# Patient Record
Sex: FEMALE | Race: WHITE | NOT HISPANIC OR LATINO | URBAN - METROPOLITAN AREA
[De-identification: names, ages, dates, MRNs, and addresses within clinical notes are randomized per-mention and may not be internally consistent; named-entity substitution may affect disease eponyms.]

---

## 2017-06-19 ENCOUNTER — EMERGENCY (EMERGENCY)
Facility: HOSPITAL | Age: 62
LOS: 1 days | Discharge: HOME | End: 2017-06-19
Attending: EMERGENCY MEDICINE | Admitting: EMERGENCY MEDICINE
Payer: COMMERCIAL

## 2017-06-19 VITALS
HEART RATE: 74 BPM | WEIGHT: 134.92 LBS | DIASTOLIC BLOOD PRESSURE: 70 MMHG | RESPIRATION RATE: 14 BRPM | SYSTOLIC BLOOD PRESSURE: 160 MMHG | TEMPERATURE: 97 F | HEIGHT: 66 IN | OXYGEN SATURATION: 100 %

## 2017-06-19 VITALS
TEMPERATURE: 98 F | DIASTOLIC BLOOD PRESSURE: 64 MMHG | RESPIRATION RATE: 14 BRPM | SYSTOLIC BLOOD PRESSURE: 121 MMHG | HEART RATE: 70 BPM | OXYGEN SATURATION: 100 %

## 2017-06-19 DIAGNOSIS — Z88.1 ALLERGY STATUS TO OTHER ANTIBIOTIC AGENTS STATUS: ICD-10-CM

## 2017-06-19 DIAGNOSIS — R11.2 NAUSEA WITH VOMITING, UNSPECIFIED: ICD-10-CM

## 2017-06-19 DIAGNOSIS — G43.909 MIGRAINE, UNSPECIFIED, NOT INTRACTABLE, WITHOUT STATUS MIGRAINOSUS: ICD-10-CM

## 2017-06-19 DIAGNOSIS — Z87.891 PERSONAL HISTORY OF NICOTINE DEPENDENCE: ICD-10-CM

## 2017-06-19 DIAGNOSIS — I10 ESSENTIAL (PRIMARY) HYPERTENSION: ICD-10-CM

## 2017-06-19 LAB
ALBUMIN SERPL ELPH-MCNC: 3.6 G/DL — SIGNIFICANT CHANGE UP (ref 3.3–5)
ALP SERPL-CCNC: 55 U/L — SIGNIFICANT CHANGE UP (ref 40–120)
ALT FLD-CCNC: 21 U/L — SIGNIFICANT CHANGE UP (ref 12–78)
AMYLASE P1 CFR SERPL: 86 U/L — SIGNIFICANT CHANGE UP (ref 25–115)
ANION GAP SERPL CALC-SCNC: 7 MMOL/L — SIGNIFICANT CHANGE UP (ref 5–17)
AST SERPL-CCNC: 20 U/L — SIGNIFICANT CHANGE UP (ref 15–37)
BASOPHILS # BLD AUTO: 0.1 K/UL — SIGNIFICANT CHANGE UP (ref 0–0.2)
BASOPHILS NFR BLD AUTO: 1.3 % — SIGNIFICANT CHANGE UP (ref 0–2)
BILIRUB SERPL-MCNC: 0.5 MG/DL — SIGNIFICANT CHANGE UP (ref 0.2–1.2)
BUN SERPL-MCNC: 8 MG/DL — SIGNIFICANT CHANGE UP (ref 7–23)
CALCIUM SERPL-MCNC: 8.6 MG/DL — SIGNIFICANT CHANGE UP (ref 8.5–10.1)
CHLORIDE SERPL-SCNC: 95 MMOL/L — LOW (ref 96–108)
CO2 SERPL-SCNC: 31 MMOL/L — SIGNIFICANT CHANGE UP (ref 22–31)
CREAT SERPL-MCNC: 0.56 MG/DL — SIGNIFICANT CHANGE UP (ref 0.5–1.3)
EOSINOPHIL # BLD AUTO: 0.1 K/UL — SIGNIFICANT CHANGE UP (ref 0–0.5)
EOSINOPHIL NFR BLD AUTO: 1.2 % — SIGNIFICANT CHANGE UP (ref 0–6)
GLUCOSE SERPL-MCNC: 79 MG/DL — SIGNIFICANT CHANGE UP (ref 70–99)
HCT VFR BLD CALC: 39.8 % — SIGNIFICANT CHANGE UP (ref 34.5–45)
HGB BLD-MCNC: 13.7 G/DL — SIGNIFICANT CHANGE UP (ref 11.5–15.5)
LIDOCAIN IGE QN: 127 U/L — SIGNIFICANT CHANGE UP (ref 73–393)
LYMPHOCYTES # BLD AUTO: 1.4 K/UL — SIGNIFICANT CHANGE UP (ref 1–3.3)
LYMPHOCYTES # BLD AUTO: 21 % — SIGNIFICANT CHANGE UP (ref 13–44)
MCHC RBC-ENTMCNC: 34.4 GM/DL — SIGNIFICANT CHANGE UP (ref 32–36)
MCHC RBC-ENTMCNC: 35.8 PG — HIGH (ref 27–34)
MCV RBC AUTO: 104 FL — HIGH (ref 80–100)
MONOCYTES # BLD AUTO: 0.6 K/UL — SIGNIFICANT CHANGE UP (ref 0–0.9)
MONOCYTES NFR BLD AUTO: 9.6 % — HIGH (ref 1–9)
NEUTROPHILS # BLD AUTO: 4.4 K/UL — SIGNIFICANT CHANGE UP (ref 1.8–7.4)
NEUTROPHILS NFR BLD AUTO: 66.8 % — SIGNIFICANT CHANGE UP (ref 43–77)
PLATELET # BLD AUTO: 220 K/UL — SIGNIFICANT CHANGE UP (ref 150–400)
POTASSIUM SERPL-MCNC: 3.2 MMOL/L — LOW (ref 3.5–5.3)
POTASSIUM SERPL-SCNC: 3.2 MMOL/L — LOW (ref 3.5–5.3)
PROT SERPL-MCNC: 6.6 G/DL — SIGNIFICANT CHANGE UP (ref 6–8.3)
RBC # BLD: 3.83 M/UL — SIGNIFICANT CHANGE UP (ref 3.8–5.2)
RBC # FLD: 11.9 % — SIGNIFICANT CHANGE UP (ref 10.3–14.5)
SODIUM SERPL-SCNC: 133 MMOL/L — LOW (ref 135–145)
WBC # BLD: 6.5 K/UL — SIGNIFICANT CHANGE UP (ref 3.8–10.5)
WBC # FLD AUTO: 6.5 K/UL — SIGNIFICANT CHANGE UP (ref 3.8–10.5)

## 2017-06-19 PROCEDURE — 80053 COMPREHEN METABOLIC PANEL: CPT

## 2017-06-19 PROCEDURE — 70551 MRI BRAIN STEM W/O DYE: CPT | Mod: 26

## 2017-06-19 PROCEDURE — 72149 MRI LUMBAR SPINE W/DYE: CPT | Mod: 26

## 2017-06-19 PROCEDURE — 70551 MRI BRAIN STEM W/O DYE: CPT

## 2017-06-19 PROCEDURE — 85027 COMPLETE CBC AUTOMATED: CPT

## 2017-06-19 PROCEDURE — 99284 EMERGENCY DEPT VISIT MOD MDM: CPT | Mod: 25

## 2017-06-19 PROCEDURE — 99285 EMERGENCY DEPT VISIT HI MDM: CPT

## 2017-06-19 PROCEDURE — 96374 THER/PROPH/DIAG INJ IV PUSH: CPT

## 2017-06-19 PROCEDURE — 96361 HYDRATE IV INFUSION ADD-ON: CPT

## 2017-06-19 PROCEDURE — 72142 MRI NECK SPINE W/DYE: CPT | Mod: 26

## 2017-06-19 PROCEDURE — 72149 MRI LUMBAR SPINE W/DYE: CPT

## 2017-06-19 PROCEDURE — 83690 ASSAY OF LIPASE: CPT

## 2017-06-19 PROCEDURE — 82150 ASSAY OF AMYLASE: CPT

## 2017-06-19 PROCEDURE — 72142 MRI NECK SPINE W/DYE: CPT

## 2017-06-19 PROCEDURE — 96375 TX/PRO/DX INJ NEW DRUG ADDON: CPT

## 2017-06-19 RX ORDER — SODIUM CHLORIDE 9 MG/ML
1000 INJECTION INTRAMUSCULAR; INTRAVENOUS; SUBCUTANEOUS ONCE
Qty: 0 | Refills: 0 | Status: COMPLETED | OUTPATIENT
Start: 2017-06-19 | End: 2017-06-19

## 2017-06-19 RX ORDER — SODIUM CHLORIDE 9 MG/ML
3 INJECTION INTRAMUSCULAR; INTRAVENOUS; SUBCUTANEOUS ONCE
Qty: 0 | Refills: 0 | Status: COMPLETED | OUTPATIENT
Start: 2017-06-19 | End: 2017-06-19

## 2017-06-19 RX ORDER — KETOROLAC TROMETHAMINE 30 MG/ML
30 SYRINGE (ML) INJECTION ONCE
Qty: 0 | Refills: 0 | Status: DISCONTINUED | OUTPATIENT
Start: 2017-06-19 | End: 2017-06-19

## 2017-06-19 RX ORDER — METOCLOPRAMIDE HCL 10 MG
10 TABLET ORAL ONCE
Qty: 0 | Refills: 0 | Status: COMPLETED | OUTPATIENT
Start: 2017-06-19 | End: 2017-06-19

## 2017-06-19 RX ORDER — MAGNESIUM SULFATE 500 MG/ML
2 VIAL (ML) INJECTION ONCE
Qty: 0 | Refills: 0 | Status: COMPLETED | OUTPATIENT
Start: 2017-06-19 | End: 2017-06-19

## 2017-06-19 RX ORDER — POTASSIUM CHLORIDE 20 MEQ
40 PACKET (EA) ORAL ONCE
Qty: 0 | Refills: 0 | Status: COMPLETED | OUTPATIENT
Start: 2017-06-19 | End: 2017-06-19

## 2017-06-19 RX ORDER — DIPHENHYDRAMINE HCL 50 MG
50 CAPSULE ORAL ONCE
Qty: 0 | Refills: 0 | Status: COMPLETED | OUTPATIENT
Start: 2017-06-19 | End: 2017-06-19

## 2017-06-19 RX ADMIN — SODIUM CHLORIDE 1000 MILLILITER(S): 9 INJECTION INTRAMUSCULAR; INTRAVENOUS; SUBCUTANEOUS at 19:14

## 2017-06-19 RX ADMIN — Medication 30 MILLIGRAM(S): at 22:32

## 2017-06-19 RX ADMIN — Medication 10 MILLIGRAM(S): at 16:11

## 2017-06-19 RX ADMIN — Medication 50 GRAM(S): at 21:29

## 2017-06-19 RX ADMIN — SODIUM CHLORIDE 3 MILLILITER(S): 9 INJECTION INTRAMUSCULAR; INTRAVENOUS; SUBCUTANEOUS at 16:12

## 2017-06-19 RX ADMIN — Medication 30 MILLIGRAM(S): at 22:00

## 2017-06-19 RX ADMIN — SODIUM CHLORIDE 1000 MILLILITER(S): 9 INJECTION INTRAMUSCULAR; INTRAVENOUS; SUBCUTANEOUS at 20:06

## 2017-06-19 RX ADMIN — SODIUM CHLORIDE 1000 MILLILITER(S): 9 INJECTION INTRAMUSCULAR; INTRAVENOUS; SUBCUTANEOUS at 16:12

## 2017-06-19 RX ADMIN — Medication 125 MILLIGRAM(S): at 20:03

## 2017-06-19 RX ADMIN — Medication 40 MILLIEQUIVALENT(S): at 21:14

## 2017-06-19 RX ADMIN — Medication 50 MILLIGRAM(S): at 16:11

## 2017-06-19 NOTE — ED PROVIDER NOTE - CHPI ED SYMPTOMS NEG
no confusion/no dizziness/no weakness/no fever/no loss of consciousness/no change in level of consciousness

## 2017-06-19 NOTE — ED ADULT NURSE NOTE - CHPI ED SYMPTOMS NEG
no loss of consciousness/no change in level of consciousness/no blurred vision/no fever/no nausea/no dizziness/no confusion

## 2017-06-19 NOTE — ED PROVIDER NOTE - OBJECTIVE STATEMENT
Pt is a 60 yo female who presents to the ED with a cc of post LP headache.  Pt reports that she had a lumbar epidural placed on June 9th.  Since then she has been experiencing a dull diffuse HA associated with photophobia, phonophobia, chills, N/V, and blurry vision.  Pt reports that in 2010 she had a similar headache after receiving a lumbar epidural and required a blood patch at that time.   She was seen at [facility] ED last Wednesday and received 1 liter of fluid, had a CT head which was negative for acute findings, had blood work preformed, and received narcotic medication.  Pt reported no improvement in symptoms.  She followed up with Dr. Winter from neurology today and was sent to the ED for possible blood path

## 2017-06-19 NOTE — ED PROVIDER NOTE - CARE PLAN
Principal Discharge DX:	Migraine  Instructions for follow-up, activity and diet:	Return to the ED for any new or worsening symptoms  Take your medication as prescribed previously  Stop the Percocet and continue the Fioricet as prescribed   Follow up with your neurologist in 2-3 days for a recheck   Advance activity as tolerated  Secondary Diagnosis:	Headache

## 2017-06-19 NOTE — PROGRESS NOTE ADULT - SUBJECTIVE AND OBJECTIVE BOX
Anesthesia Note    I was asked to see this pleasant 60 y/o who is 9 days out from a transforaminal lumbar epidural injection for back pain.  The patient came to the ER with a complaint of a frontal headache.  The patient has had previous epidurals before with post procedural headaches.  The patient reports the headache is constant, unrelieved by lying down.  She has no tinnitus but numbness and tingling of her hands and more so the feet.  Her labs are essentially normal and she has no documented focal neurologic findings.  There are several factors that make this unlikely to be a post dural puncture headache.  The report of the original procedure was reviewed and there was no evidence of extravasation of contrast out of the epidural space.  The patients age is not typically one that gets post dural puncture headaches.  The length of the symptoms and the fact that it does not get better when recumbent are also issues leading me away from that diagnosis.  The numbness and tingling of the feet, which is getting worse,  also leads me away from a PDPH   I have spoken to the patient, her  and the ER attending Dr Barth about my findings.  The ER will perform a head MRI to eliminate and significant pathology.

## 2017-06-19 NOTE — ED ADULT NURSE NOTE - OBJECTIVE STATEMENT
pt came in ED with c/o headache X 10 days. pt states she had LP epidural done 06/09/2017, since then she has been experiencing dull headache. pt added she was s/e at a diff hosp and was discharged with a  prescription of Percocet.

## 2017-06-19 NOTE — ED PROVIDER NOTE - PLAN OF CARE
Return to the ED for any new or worsening symptoms  Take your medication as prescribed previously  Stop the Percocet and continue the Fioricet as prescribed   Follow up with your neurologist in 2-3 days for a recheck   Advance activity as tolerated

## 2017-06-24 ENCOUNTER — EMERGENCY (EMERGENCY)
Facility: HOSPITAL | Age: 62
LOS: 1 days | Discharge: HOME | End: 2017-06-24
Attending: EMERGENCY MEDICINE | Admitting: EMERGENCY MEDICINE
Payer: COMMERCIAL

## 2017-06-24 VITALS
TEMPERATURE: 98 F | HEART RATE: 71 BPM | DIASTOLIC BLOOD PRESSURE: 72 MMHG | OXYGEN SATURATION: 100 % | SYSTOLIC BLOOD PRESSURE: 107 MMHG | RESPIRATION RATE: 15 BRPM

## 2017-06-24 VITALS
TEMPERATURE: 98 F | OXYGEN SATURATION: 100 % | WEIGHT: 134.92 LBS | SYSTOLIC BLOOD PRESSURE: 123 MMHG | RESPIRATION RATE: 16 BRPM | DIASTOLIC BLOOD PRESSURE: 66 MMHG | HEIGHT: 66 IN | HEART RATE: 79 BPM

## 2017-06-24 DIAGNOSIS — R51 HEADACHE: ICD-10-CM

## 2017-06-24 DIAGNOSIS — Z88.1 ALLERGY STATUS TO OTHER ANTIBIOTIC AGENTS STATUS: ICD-10-CM

## 2017-06-24 DIAGNOSIS — I10 ESSENTIAL (PRIMARY) HYPERTENSION: ICD-10-CM

## 2017-06-24 PROCEDURE — 99284 EMERGENCY DEPT VISIT MOD MDM: CPT | Mod: 25

## 2017-06-24 PROCEDURE — 96361 HYDRATE IV INFUSION ADD-ON: CPT

## 2017-06-24 PROCEDURE — 99284 EMERGENCY DEPT VISIT MOD MDM: CPT

## 2017-06-24 PROCEDURE — 96375 TX/PRO/DX INJ NEW DRUG ADDON: CPT

## 2017-06-24 PROCEDURE — 96374 THER/PROPH/DIAG INJ IV PUSH: CPT

## 2017-06-24 RX ORDER — SODIUM CHLORIDE 9 MG/ML
1000 INJECTION INTRAMUSCULAR; INTRAVENOUS; SUBCUTANEOUS ONCE
Qty: 0 | Refills: 0 | Status: COMPLETED | OUTPATIENT
Start: 2017-06-24 | End: 2017-06-24

## 2017-06-24 RX ORDER — KETOROLAC TROMETHAMINE 30 MG/ML
30 SYRINGE (ML) INJECTION ONCE
Qty: 0 | Refills: 0 | Status: DISCONTINUED | OUTPATIENT
Start: 2017-06-24 | End: 2017-06-24

## 2017-06-24 RX ORDER — METOCLOPRAMIDE HCL 10 MG
10 TABLET ORAL ONCE
Qty: 0 | Refills: 0 | Status: COMPLETED | OUTPATIENT
Start: 2017-06-24 | End: 2017-06-24

## 2017-06-24 RX ADMIN — Medication 30 MILLIGRAM(S): at 16:56

## 2017-06-24 RX ADMIN — Medication 30 MILLIGRAM(S): at 17:26

## 2017-06-24 RX ADMIN — SODIUM CHLORIDE 1000 MILLILITER(S): 9 INJECTION INTRAMUSCULAR; INTRAVENOUS; SUBCUTANEOUS at 16:56

## 2017-06-24 RX ADMIN — Medication 10 MILLIGRAM(S): at 16:57

## 2017-06-24 NOTE — ED PROVIDER NOTE - MUSCULOSKELETAL, MLM
Neck: supple, non tender. Spine appears normal, range of motion is not limited, no muscle or joint tenderness

## 2017-06-24 NOTE — ED PROVIDER NOTE - OBJECTIVE STATEMENT
60 y/o F with h/o migraine headaches c/o headache. Has had intermittent headaches over the past 3 weeks.  Began after a lumbar spine epidural.  Followed up  again with her spine dr.  Not felt to be a spinal fluid leak.  Seen here last week for same.  MRI brain and C/LS spine unremarkable.  Seen by anesthesia.  Again not felt to be a leak.   States she is still having headaches.   Denies fever, vomiting, visual changes, extremity weakness, or other complaints.

## 2017-06-24 NOTE — ED ADULT NURSE NOTE - CHPI ED SYMPTOMS NEG
no vomiting/no dizziness/no nausea/no fever/no numbness/no change in level of consciousness/no weakness/no confusion/no loss of consciousness/no blurred vision

## 2017-06-24 NOTE — ED ADULT NURSE NOTE - OBJECTIVE STATEMENT
62yo female presents to ED alert and oriented X4.  steady gait noted.  patient c/o headache for one month after epidural procedure for back pain.  patient denies nausea, blurred vision, dizziness, shortness of breath, chest pain, lightheadedness, neck pain, palpitations.  respirations even and unlabored.

## 2017-12-13 ENCOUNTER — EMERGENCY (EMERGENCY)
Facility: HOSPITAL | Age: 62
LOS: 1 days | Discharge: HOME | End: 2017-12-13
Attending: EMERGENCY MEDICINE | Admitting: EMERGENCY MEDICINE
Payer: COMMERCIAL

## 2017-12-13 VITALS
OXYGEN SATURATION: 100 % | RESPIRATION RATE: 14 BRPM | TEMPERATURE: 97 F | SYSTOLIC BLOOD PRESSURE: 123 MMHG | HEART RATE: 67 BPM | DIASTOLIC BLOOD PRESSURE: 68 MMHG

## 2017-12-13 VITALS
SYSTOLIC BLOOD PRESSURE: 147 MMHG | TEMPERATURE: 98 F | DIASTOLIC BLOOD PRESSURE: 85 MMHG | HEART RATE: 79 BPM | HEIGHT: 66 IN | RESPIRATION RATE: 16 BRPM | WEIGHT: 132.06 LBS | OXYGEN SATURATION: 100 %

## 2017-12-13 LAB
ALBUMIN SERPL ELPH-MCNC: 3.8 G/DL — SIGNIFICANT CHANGE UP (ref 3.3–5)
ALP SERPL-CCNC: 56 U/L — SIGNIFICANT CHANGE UP (ref 40–120)
ALT FLD-CCNC: 22 U/L — SIGNIFICANT CHANGE UP (ref 12–78)
ANION GAP SERPL CALC-SCNC: 6 MMOL/L — SIGNIFICANT CHANGE UP (ref 5–17)
AST SERPL-CCNC: 17 U/L — SIGNIFICANT CHANGE UP (ref 15–37)
BASOPHILS # BLD AUTO: 0.1 K/UL — SIGNIFICANT CHANGE UP (ref 0–0.2)
BASOPHILS NFR BLD AUTO: 1 % — SIGNIFICANT CHANGE UP (ref 0–2)
BILIRUB SERPL-MCNC: 0.4 MG/DL — SIGNIFICANT CHANGE UP (ref 0.2–1.2)
BUN SERPL-MCNC: 11 MG/DL — SIGNIFICANT CHANGE UP (ref 7–23)
CALCIUM SERPL-MCNC: 8.8 MG/DL — SIGNIFICANT CHANGE UP (ref 8.5–10.1)
CHLORIDE SERPL-SCNC: 95 MMOL/L — LOW (ref 96–108)
CK MB BLD-MCNC: 3 % — SIGNIFICANT CHANGE UP (ref 0–3.5)
CK MB BLD-MCNC: 3.8 % — HIGH (ref 0–3.5)
CK MB CFR SERPL CALC: 1.7 NG/ML — SIGNIFICANT CHANGE UP (ref 0–3.6)
CK MB CFR SERPL CALC: 1.9 NG/ML — SIGNIFICANT CHANGE UP (ref 0–3.6)
CK SERPL-CCNC: 45 U/L — SIGNIFICANT CHANGE UP (ref 26–192)
CK SERPL-CCNC: 63 U/L — SIGNIFICANT CHANGE UP (ref 26–192)
CO2 SERPL-SCNC: 33 MMOL/L — HIGH (ref 22–31)
CREAT SERPL-MCNC: 0.71 MG/DL — SIGNIFICANT CHANGE UP (ref 0.5–1.3)
D DIMER BLD IA.RAPID-MCNC: <150 NG/ML DDU — SIGNIFICANT CHANGE UP
EOSINOPHIL # BLD AUTO: 0.1 K/UL — SIGNIFICANT CHANGE UP (ref 0–0.5)
EOSINOPHIL NFR BLD AUTO: 1 % — SIGNIFICANT CHANGE UP (ref 0–6)
GLUCOSE SERPL-MCNC: 98 MG/DL — SIGNIFICANT CHANGE UP (ref 70–99)
HCT VFR BLD CALC: 38.8 % — SIGNIFICANT CHANGE UP (ref 34.5–45)
HGB BLD-MCNC: 13 G/DL — SIGNIFICANT CHANGE UP (ref 11.5–15.5)
LYMPHOCYTES # BLD AUTO: 1.9 K/UL — SIGNIFICANT CHANGE UP (ref 1–3.3)
LYMPHOCYTES # BLD AUTO: 27.7 % — SIGNIFICANT CHANGE UP (ref 13–44)
MCHC RBC-ENTMCNC: 33.4 GM/DL — SIGNIFICANT CHANGE UP (ref 32–36)
MCHC RBC-ENTMCNC: 35.3 PG — HIGH (ref 27–34)
MCV RBC AUTO: 105.7 FL — HIGH (ref 80–100)
MONOCYTES # BLD AUTO: 0.6 K/UL — SIGNIFICANT CHANGE UP (ref 0–0.9)
MONOCYTES NFR BLD AUTO: 9.1 % — HIGH (ref 1–9)
NEUTROPHILS # BLD AUTO: 4.1 K/UL — SIGNIFICANT CHANGE UP (ref 1.8–7.4)
NEUTROPHILS NFR BLD AUTO: 61.2 % — SIGNIFICANT CHANGE UP (ref 43–77)
PLAT MORPH BLD: NORMAL — SIGNIFICANT CHANGE UP
PLATELET # BLD AUTO: 254 K/UL — SIGNIFICANT CHANGE UP (ref 150–400)
POTASSIUM SERPL-MCNC: 3.4 MMOL/L — LOW (ref 3.5–5.3)
POTASSIUM SERPL-SCNC: 3.4 MMOL/L — LOW (ref 3.5–5.3)
PROT SERPL-MCNC: 6.8 G/DL — SIGNIFICANT CHANGE UP (ref 6–8.3)
RBC # BLD: 3.67 M/UL — LOW (ref 3.8–5.2)
RBC # FLD: 13 % — SIGNIFICANT CHANGE UP (ref 10.3–14.5)
RBC BLD AUTO: SIGNIFICANT CHANGE UP
SODIUM SERPL-SCNC: 134 MMOL/L — LOW (ref 135–145)
TROPONIN I SERPL-MCNC: <.015 NG/ML — SIGNIFICANT CHANGE UP (ref 0.01–0.04)
TROPONIN I SERPL-MCNC: <.015 NG/ML — SIGNIFICANT CHANGE UP (ref 0.01–0.04)
WBC # BLD: 6.7 K/UL — SIGNIFICANT CHANGE UP (ref 3.8–10.5)
WBC # FLD AUTO: 6.7 K/UL — SIGNIFICANT CHANGE UP (ref 3.8–10.5)

## 2017-12-13 PROCEDURE — 82550 ASSAY OF CK (CPK): CPT

## 2017-12-13 PROCEDURE — 84484 ASSAY OF TROPONIN QUANT: CPT

## 2017-12-13 PROCEDURE — 99285 EMERGENCY DEPT VISIT HI MDM: CPT

## 2017-12-13 PROCEDURE — 82553 CREATINE MB FRACTION: CPT

## 2017-12-13 PROCEDURE — 93010 ELECTROCARDIOGRAM REPORT: CPT

## 2017-12-13 PROCEDURE — 71046 X-RAY EXAM CHEST 2 VIEWS: CPT

## 2017-12-13 PROCEDURE — 80053 COMPREHEN METABOLIC PANEL: CPT

## 2017-12-13 PROCEDURE — 99284 EMERGENCY DEPT VISIT MOD MDM: CPT | Mod: 25

## 2017-12-13 PROCEDURE — 99284 EMERGENCY DEPT VISIT MOD MDM: CPT

## 2017-12-13 PROCEDURE — 71020: CPT | Mod: 26

## 2017-12-13 PROCEDURE — 93005 ELECTROCARDIOGRAM TRACING: CPT

## 2017-12-13 PROCEDURE — 85379 FIBRIN DEGRADATION QUANT: CPT

## 2017-12-13 PROCEDURE — 85027 COMPLETE CBC AUTOMATED: CPT

## 2017-12-13 RX ORDER — ALPRAZOLAM 0.25 MG
0 TABLET ORAL
Qty: 0 | Refills: 0 | COMMUNITY

## 2017-12-13 RX ORDER — ASPIRIN/CALCIUM CARB/MAGNESIUM 324 MG
325 TABLET ORAL ONCE
Qty: 0 | Refills: 0 | Status: COMPLETED | OUTPATIENT
Start: 2017-12-13 | End: 2017-12-13

## 2017-12-13 RX ORDER — SODIUM CHLORIDE 9 MG/ML
3 INJECTION INTRAMUSCULAR; INTRAVENOUS; SUBCUTANEOUS ONCE
Qty: 0 | Refills: 0 | Status: COMPLETED | OUTPATIENT
Start: 2017-12-13 | End: 2017-12-13

## 2017-12-13 RX ORDER — SODIUM CHLORIDE 9 MG/ML
1000 INJECTION INTRAMUSCULAR; INTRAVENOUS; SUBCUTANEOUS ONCE
Qty: 0 | Refills: 0 | Status: COMPLETED | OUTPATIENT
Start: 2017-12-13 | End: 2017-12-13

## 2017-12-13 RX ADMIN — SODIUM CHLORIDE 1000 MILLILITER(S): 9 INJECTION INTRAMUSCULAR; INTRAVENOUS; SUBCUTANEOUS at 16:59

## 2017-12-13 RX ADMIN — SODIUM CHLORIDE 3 MILLILITER(S): 9 INJECTION INTRAMUSCULAR; INTRAVENOUS; SUBCUTANEOUS at 16:54

## 2017-12-13 RX ADMIN — Medication 325 MILLIGRAM(S): at 16:59

## 2017-12-13 NOTE — ED PROVIDER NOTE - PROGRESS NOTE DETAILS
Pt seen by Dr Mott, check 4 hr CE, outpt fu. At length discussion with patient regarding nature of the chest pain. Discussed results of all diagnostic testing in ED. Discussed chest pain precautions and instructions. Patient demonstrates understanding that a cardiac cause of the chest pain has not been totally excluded, but at this time there is no evidence to warrant an inpatient workup.  Discussed the importance of continued follow-up with Cardiology as outpatient to complete cardiac workup.   All results were explained to patient and a copy of all available results given.

## 2017-12-13 NOTE — ED PROVIDER NOTE - OBJECTIVE STATEMENT
63 yo F pw chest heaviness since ~ 10 am today, rad to back. Mild assoc SOB. NO palp/dizzy. No numb/ting/focal weak. No recent trauma. No agg/allev factors. No recent travel / sick contacts. No cough /sputum. NO neck pain.  Min inc pain with deep insp. No agg/allev factors. No other inj or co.

## 2017-12-13 NOTE — ED ADULT NURSE NOTE - OBJECTIVE STATEMENT
pt states she was at gynecologists office and her blood pressure was elevated (160/94) and " I have a  banging headache" pt also with sternal chest pain and a pain in the middle of her back, that started this morning.  pt denies nausea or vomiting.

## 2017-12-13 NOTE — CONSULT NOTE ADULT - SUBJECTIVE AND OBJECTIVE BOX
[facility] - Cami Jenkins, Gaston, Juan, Coleen, Tiera Lopez  Office Number: [phone]    Initial Consult Note    CHIEF COMPLAINT: Patient is a 62y old  Female who presents with a chief complaint of chest pain    HPI:61 yo F pw chest heaviness since ~ 10 am today, rad to back. Mild assoc SOB. NO palp/dizzy. No numb/ting/focal weak. No recent trauma. No agg/allev factors. No recent travel / sick contacts. No cough /sputum. NO neck pain.  Min inc pain with deep insp. No agg/allev factors.    Mrs Gomez is a 62 year old, current smoker with COPD, GERD, Raynauds,  here with chest heaviness. She has not felt pain like this before recently.  She saw her cardiologist about 3 months ago, and has had normal stress test within the last few years. Of note, she has a history of MVP and HTN, and currently has been taking Inderal and Lasix for some edema.   She is chest pain free now. She has history of anxiety and chronic headaches and takes klonipin.   Today she went to her OB and was found to have BP >160/90, then spoke to her PMD who recommended that she come to the ER because of the pain  No associated difficulty breathing, palpitations, lower extremity edema      PAST MEDICAL & SURGICAL HISTORY:  Mitral valve prolapse  HTN (hypertension)  No significant past surgical history      SOCIAL HISTORY:  + current smoker, but decreased    FAMILY HISTORY:  + CAD and HTN in family    MEDICATIONS  (STANDING):    MEDICATIONS  (PRN):      Allergies    Keflex (Rash)  tetracycline (Rash)    Intolerances        REVIEW OF SYSTEMS:    CONSTITUTIONAL: + weakness, no fevers or chills  EYES/ENT: No visual changes;  No vertigo or throat pain   NECK: No pain or stiffness  RESPIRATORY: No cough, wheezing, hemoptysis; No shortness of breath  CARDIOVASCULAR: + chest pain, no palpitations  GASTROINTESTINAL: No abdominal pain. No nausea, vomiting, or hematemesis; No diarrhea or constipation. No melena or hematochezia.  GENITOURINARY: No dysuria, frequency or hematuria  NEUROLOGICAL: No numbness or weakness  SKIN: No itching or rash  All other review of systems is negative unless indicated above    VITAL SIGNS:   Vital Signs Last 24 Hrs  T(C): 36.4 (13 Dec 2017 15:57), Max: 36.4 (13 Dec 2017 15:57)  T(F): 97.6 (13 Dec 2017 15:57), Max: 97.6 (13 Dec 2017 15:57)  HR: 79 (13 Dec 2017 15:57) (79 - 79)  BP: 147/85 (13 Dec 2017 15:57) (147/85 - 147/85)  BP(mean): --  RR: 16 (13 Dec 2017 15:57) (16 - 16)  SpO2: 100% (13 Dec 2017 15:57) (100% - 100%)    I&O's Summary      On Exam:    Constitutional: NAD, alert and oriented x 3  Lungs:  Non-labored, breath sounds are clear bilaterally, No wheezing, rales or rhonchi  Cardiovascular: RRR.  S1 and S2 positive.  No murmurs, rubs, gallops or clicks  Gastrointestinal: Bowel Sounds present, soft, nontender.   Lymph: No peripheral edema. No cervical lymphadenopathy.  Neurological: Alert, no focal deficits  Skin: No rashes or ulcers; her hands are cyanotic  Psych:  Mood & affect appropriate.    LABS: All Labs Reviewed:                        13.0   6.7   )-----------( 254      ( 13 Dec 2017 16:37 )             38.8     13 Dec 2017 16:37    134    |  95     |  11     ----------------------------<  98     3.4     |  33     |  0.71     Ca    8.8        13 Dec 2017 16:37    TPro  6.8    /  Alb  3.8    /  TBili  0.4    /  DBili  x      /  AST  17     /  ALT  22     /  AlkPhos  56     13 Dec 2017 16:37      CARDIAC MARKERS ( 13 Dec 2017 16:37 )  <.015 ng/mL / x     / 63 U/L / x     / 1.9 ng/mL      Blood Culture:         RADIOLOGY:    EKG: SR, unremarkable

## 2017-12-13 NOTE — ED PROVIDER NOTE - ENMT, MLM
Airway patent, Nasal mucosa clear. Mouth with normal mucosa. Throat has no vesicles, no oropharyngeal exudates and uvula is midline. MM Moist. Non-toxic, well appearing. neck supple. no meningeal signs.

## 2018-02-13 PROBLEM — Z00.00 ENCOUNTER FOR PREVENTIVE HEALTH EXAMINATION: Status: ACTIVE | Noted: 2018-02-13

## 2018-03-15 ENCOUNTER — APPOINTMENT (OUTPATIENT)
Dept: NEUROLOGY | Facility: CLINIC | Age: 63
End: 2018-03-15
Payer: COMMERCIAL

## 2018-03-15 VITALS
WEIGHT: 121 LBS | HEART RATE: 69 BPM | HEIGHT: 66 IN | DIASTOLIC BLOOD PRESSURE: 78 MMHG | SYSTOLIC BLOOD PRESSURE: 136 MMHG | BODY MASS INDEX: 19.44 KG/M2

## 2018-03-15 DIAGNOSIS — F15.90 OTHER STIMULANT USE, UNSPECIFIED, UNCOMPLICATED: ICD-10-CM

## 2018-03-15 DIAGNOSIS — Z86.79 PERSONAL HISTORY OF OTHER DISEASES OF THE CIRCULATORY SYSTEM: ICD-10-CM

## 2018-03-15 DIAGNOSIS — Z87.891 PERSONAL HISTORY OF NICOTINE DEPENDENCE: ICD-10-CM

## 2018-03-15 DIAGNOSIS — G44.209 TENSION-TYPE HEADACHE, UNSPECIFIED, NOT INTRACTABLE: ICD-10-CM

## 2018-03-15 DIAGNOSIS — Z78.9 OTHER SPECIFIED HEALTH STATUS: ICD-10-CM

## 2018-03-15 DIAGNOSIS — J45.20 MILD INTERMITTENT ASTHMA, UNCOMPLICATED: ICD-10-CM

## 2018-03-15 DIAGNOSIS — Z82.0 FAMILY HISTORY OF EPILEPSY AND OTHER DISEASES OF THE NERVOUS SYSTEM: ICD-10-CM

## 2018-03-15 DIAGNOSIS — J44.9 CHRONIC OBSTRUCTIVE PULMONARY DISEASE, UNSPECIFIED: ICD-10-CM

## 2018-03-15 DIAGNOSIS — Z86.69 PERSONAL HISTORY OF OTHER DISEASES OF THE NERVOUS SYSTEM AND SENSE ORGANS: ICD-10-CM

## 2018-03-15 PROCEDURE — 99244 OFF/OP CNSLTJ NEW/EST MOD 40: CPT

## 2018-03-15 RX ORDER — AZITHROMYCIN 250 MG/1
250 TABLET, FILM COATED ORAL
Qty: 10 | Refills: 0 | Status: ACTIVE | COMMUNITY
Start: 2018-01-15

## 2018-03-15 RX ORDER — AMPICILLIN 250 MG/1
250 CAPSULE ORAL
Qty: 40 | Refills: 0 | Status: ACTIVE | COMMUNITY
Start: 2017-10-16

## 2018-03-15 RX ORDER — CLARITHROMYCIN 500 MG/1
500 TABLET, FILM COATED ORAL
Qty: 30 | Refills: 0 | Status: ACTIVE | COMMUNITY
Start: 2017-09-22

## 2018-03-15 RX ORDER — AMITRIPTYLINE HYDROCHLORIDE 25 MG/1
25 TABLET, FILM COATED ORAL
Qty: 60 | Refills: 0 | Status: ACTIVE | COMMUNITY
Start: 2017-10-26

## 2018-03-15 RX ORDER — ACETAMINOPHEN, CAFFEINE, DIHYDROCODEINE BITARTRATE 320.5; 30; 16 MG/1; MG/1; MG/1
320.5-30-16 CAPSULE ORAL
Qty: 60 | Refills: 0 | Status: ACTIVE | COMMUNITY
Start: 2018-02-16

## 2018-03-15 RX ORDER — FLUCONAZOLE 200 MG/1
200 TABLET ORAL
Qty: 10 | Refills: 0 | Status: ACTIVE | COMMUNITY
Start: 2017-10-18

## 2018-03-15 RX ORDER — CLOTRIMAZOLE AND BETAMETHASONE DIPROPIONATE 10; .5 MG/G; MG/G
1-0.05 CREAM TOPICAL
Qty: 45 | Refills: 0 | Status: ACTIVE | COMMUNITY
Start: 2017-09-25

## 2018-03-15 RX ORDER — FLUTICASONE PROPIONATE 50 UG/1
50 SPRAY, METERED NASAL
Qty: 16 | Refills: 0 | Status: ACTIVE | COMMUNITY
Start: 2018-01-15

## 2018-03-15 RX ORDER — AMOXICILLIN 875 MG/1
875 TABLET, FILM COATED ORAL
Qty: 20 | Refills: 0 | Status: ACTIVE | COMMUNITY
Start: 2018-01-23

## 2018-03-15 RX ORDER — ALBUTEROL SULFATE 2.5 MG/3ML
(2.5 MG/3ML) SOLUTION RESPIRATORY (INHALATION)
Qty: 225 | Refills: 0 | Status: ACTIVE | COMMUNITY
Start: 2017-10-18

## 2018-03-15 RX ORDER — CLINDAMYCIN PHOSPHATE 100 MG/5G
2 CREAM VAGINAL
Qty: 58 | Refills: 0 | Status: ACTIVE | COMMUNITY
Start: 2018-03-09

## 2018-03-15 RX ORDER — CYCLOBENZAPRINE HYDROCHLORIDE 5 MG/1
5 TABLET, FILM COATED ORAL
Qty: 14 | Refills: 0 | Status: ACTIVE | COMMUNITY
Start: 2017-10-20

## 2018-03-15 RX ORDER — ONABOTULINUMTOXINA 200 [USP'U]/1
200 INJECTION, POWDER, LYOPHILIZED, FOR SOLUTION INTRADERMAL; INTRAMUSCULAR
Qty: 1 | Refills: 0 | Status: ACTIVE | COMMUNITY
Start: 2018-02-27

## 2018-03-15 RX ORDER — BUTALBITAL, ACETAMINOPHEN AND CAFFEINE 300; 50; 40 MG/1; MG/1; MG/1
50-300-40 CAPSULE ORAL
Qty: 40 | Refills: 0 | Status: ACTIVE | COMMUNITY
Start: 2018-03-06

## 2018-05-01 ENCOUNTER — EMERGENCY (EMERGENCY)
Facility: HOSPITAL | Age: 63
LOS: 1 days | Discharge: HOME | End: 2018-05-01
Attending: EMERGENCY MEDICINE | Admitting: EMERGENCY MEDICINE
Payer: COMMERCIAL

## 2018-05-01 VITALS
HEIGHT: 66 IN | DIASTOLIC BLOOD PRESSURE: 79 MMHG | RESPIRATION RATE: 15 BRPM | SYSTOLIC BLOOD PRESSURE: 165 MMHG | OXYGEN SATURATION: 100 % | TEMPERATURE: 98 F | HEART RATE: 67 BPM | WEIGHT: 134.92 LBS

## 2018-05-01 VITALS
RESPIRATION RATE: 18 BRPM | OXYGEN SATURATION: 100 % | DIASTOLIC BLOOD PRESSURE: 71 MMHG | HEART RATE: 62 BPM | SYSTOLIC BLOOD PRESSURE: 118 MMHG | TEMPERATURE: 99 F

## 2018-05-01 LAB
ALBUMIN SERPL ELPH-MCNC: 3.6 G/DL — SIGNIFICANT CHANGE UP (ref 3.3–5)
ALP SERPL-CCNC: 62 U/L — SIGNIFICANT CHANGE UP (ref 40–120)
ALT FLD-CCNC: 19 U/L — SIGNIFICANT CHANGE UP (ref 12–78)
ANION GAP SERPL CALC-SCNC: 9 MMOL/L — SIGNIFICANT CHANGE UP (ref 5–17)
AST SERPL-CCNC: 18 U/L — SIGNIFICANT CHANGE UP (ref 15–37)
BASOPHILS # BLD AUTO: 0.1 K/UL — SIGNIFICANT CHANGE UP (ref 0–0.2)
BASOPHILS NFR BLD AUTO: 1.1 % — SIGNIFICANT CHANGE UP (ref 0–2)
BILIRUB SERPL-MCNC: 0.4 MG/DL — SIGNIFICANT CHANGE UP (ref 0.2–1.2)
BUN SERPL-MCNC: 12 MG/DL — SIGNIFICANT CHANGE UP (ref 7–23)
CALCIUM SERPL-MCNC: 8.5 MG/DL — SIGNIFICANT CHANGE UP (ref 8.5–10.1)
CHLORIDE SERPL-SCNC: 91 MMOL/L — LOW (ref 96–108)
CK MB BLD-MCNC: 4.6 % — HIGH (ref 0–3.5)
CK MB CFR SERPL CALC: 2.5 NG/ML — SIGNIFICANT CHANGE UP (ref 0–3.6)
CK SERPL-CCNC: 54 U/L — SIGNIFICANT CHANGE UP (ref 26–192)
CO2 SERPL-SCNC: 29 MMOL/L — SIGNIFICANT CHANGE UP (ref 22–31)
CREAT SERPL-MCNC: 0.63 MG/DL — SIGNIFICANT CHANGE UP (ref 0.5–1.3)
EOSINOPHIL # BLD AUTO: 0.1 K/UL — SIGNIFICANT CHANGE UP (ref 0–0.5)
EOSINOPHIL NFR BLD AUTO: 0.9 % — SIGNIFICANT CHANGE UP (ref 0–6)
GLUCOSE SERPL-MCNC: 91 MG/DL — SIGNIFICANT CHANGE UP (ref 70–99)
HCT VFR BLD CALC: 36.8 % — SIGNIFICANT CHANGE UP (ref 34.5–45)
HGB BLD-MCNC: 13.1 G/DL — SIGNIFICANT CHANGE UP (ref 11.5–15.5)
LYMPHOCYTES # BLD AUTO: 1.8 K/UL — SIGNIFICANT CHANGE UP (ref 1–3.3)
LYMPHOCYTES # BLD AUTO: 27 % — SIGNIFICANT CHANGE UP (ref 13–44)
MCHC RBC-ENTMCNC: 35.4 PG — HIGH (ref 27–34)
MCHC RBC-ENTMCNC: 35.5 GM/DL — SIGNIFICANT CHANGE UP (ref 32–36)
MCV RBC AUTO: 99.8 FL — SIGNIFICANT CHANGE UP (ref 80–100)
MONOCYTES # BLD AUTO: 0.6 K/UL — SIGNIFICANT CHANGE UP (ref 0–0.9)
MONOCYTES NFR BLD AUTO: 8.8 % — SIGNIFICANT CHANGE UP (ref 1–9)
NEUTROPHILS # BLD AUTO: 4.2 K/UL — SIGNIFICANT CHANGE UP (ref 1.8–7.4)
NEUTROPHILS NFR BLD AUTO: 62.2 % — SIGNIFICANT CHANGE UP (ref 43–77)
PLATELET # BLD AUTO: 196 K/UL — SIGNIFICANT CHANGE UP (ref 150–400)
POTASSIUM SERPL-MCNC: 3.7 MMOL/L — SIGNIFICANT CHANGE UP (ref 3.5–5.3)
POTASSIUM SERPL-SCNC: 3.7 MMOL/L — SIGNIFICANT CHANGE UP (ref 3.5–5.3)
PROT SERPL-MCNC: 6.7 G/DL — SIGNIFICANT CHANGE UP (ref 6–8.3)
RBC # BLD: 3.69 M/UL — LOW (ref 3.8–5.2)
RBC # FLD: 11.2 % — SIGNIFICANT CHANGE UP (ref 10.3–14.5)
SODIUM SERPL-SCNC: 129 MMOL/L — LOW (ref 135–145)
TROPONIN I SERPL-MCNC: <.015 NG/ML — SIGNIFICANT CHANGE UP (ref 0.01–0.04)
WBC # BLD: 6.8 K/UL — SIGNIFICANT CHANGE UP (ref 3.8–10.5)
WBC # FLD AUTO: 6.8 K/UL — SIGNIFICANT CHANGE UP (ref 3.8–10.5)

## 2018-05-01 PROCEDURE — 82553 CREATINE MB FRACTION: CPT

## 2018-05-01 PROCEDURE — 99285 EMERGENCY DEPT VISIT HI MDM: CPT

## 2018-05-01 PROCEDURE — 96375 TX/PRO/DX INJ NEW DRUG ADDON: CPT

## 2018-05-01 PROCEDURE — 70450 CT HEAD/BRAIN W/O DYE: CPT

## 2018-05-01 PROCEDURE — 71045 X-RAY EXAM CHEST 1 VIEW: CPT | Mod: 26

## 2018-05-01 PROCEDURE — 82550 ASSAY OF CK (CPK): CPT

## 2018-05-01 PROCEDURE — 93005 ELECTROCARDIOGRAM TRACING: CPT

## 2018-05-01 PROCEDURE — 36415 COLL VENOUS BLD VENIPUNCTURE: CPT

## 2018-05-01 PROCEDURE — 71045 X-RAY EXAM CHEST 1 VIEW: CPT

## 2018-05-01 PROCEDURE — 99284 EMERGENCY DEPT VISIT MOD MDM: CPT | Mod: 25

## 2018-05-01 PROCEDURE — 96365 THER/PROPH/DIAG IV INF INIT: CPT

## 2018-05-01 PROCEDURE — 70450 CT HEAD/BRAIN W/O DYE: CPT | Mod: 26

## 2018-05-01 PROCEDURE — 84484 ASSAY OF TROPONIN QUANT: CPT

## 2018-05-01 PROCEDURE — 85027 COMPLETE CBC AUTOMATED: CPT

## 2018-05-01 PROCEDURE — 80053 COMPREHEN METABOLIC PANEL: CPT

## 2018-05-01 RX ORDER — SODIUM CHLORIDE 9 MG/ML
1000 INJECTION INTRAMUSCULAR; INTRAVENOUS; SUBCUTANEOUS ONCE
Qty: 0 | Refills: 0 | Status: COMPLETED | OUTPATIENT
Start: 2018-05-01 | End: 2018-05-01

## 2018-05-01 RX ORDER — FAMOTIDINE 10 MG/ML
20 INJECTION INTRAVENOUS ONCE
Qty: 0 | Refills: 0 | Status: COMPLETED | OUTPATIENT
Start: 2018-05-01 | End: 2018-05-01

## 2018-05-01 RX ORDER — ONDANSETRON 8 MG/1
4 TABLET, FILM COATED ORAL ONCE
Qty: 0 | Refills: 0 | Status: COMPLETED | OUTPATIENT
Start: 2018-05-01 | End: 2018-05-01

## 2018-05-01 RX ORDER — FAMOTIDINE 10 MG/ML
1 INJECTION INTRAVENOUS
Qty: 10 | Refills: 0
Start: 2018-05-01 | End: 2018-05-05

## 2018-05-01 RX ORDER — FAMOTIDINE 10 MG/ML
20 INJECTION INTRAVENOUS ONCE
Qty: 0 | Refills: 0 | Status: DISCONTINUED | OUTPATIENT
Start: 2018-05-01 | End: 2018-05-01

## 2018-05-01 RX ORDER — DIPHENHYDRAMINE HCL 50 MG
1 CAPSULE ORAL
Qty: 6 | Refills: 0
Start: 2018-05-01 | End: 2018-05-02

## 2018-05-01 RX ADMIN — SODIUM CHLORIDE 1000 MILLILITER(S): 9 INJECTION INTRAMUSCULAR; INTRAVENOUS; SUBCUTANEOUS at 17:55

## 2018-05-01 RX ADMIN — FAMOTIDINE 100 MILLIGRAM(S): 10 INJECTION INTRAVENOUS at 17:56

## 2018-05-01 RX ADMIN — ONDANSETRON 4 MILLIGRAM(S): 8 TABLET, FILM COATED ORAL at 17:56

## 2018-05-01 NOTE — ED ADULT NURSE NOTE - ED STAT RN HANDOFF DETAILS
Pt stable and resting in bed. Pt denies any pain or discomfort as of this time. Pt handoff report given to RN nelda for continuum of care. No sign of distress noted.

## 2018-05-01 NOTE — ED ADULT NURSE NOTE - OBJECTIVE STATEMENT
Pt in bed alert and oriented and resting in bed with the c/o headache, sinus congestion and nausea. As per Md's orders IV duran placed blood psecimen obtained and sent to lab. Meds given and tolerated well. Nursing care ongoing and safety maintained.

## 2018-05-01 NOTE — ED PROVIDER NOTE - ATTENDING CONTRIBUTION TO CARE
63yo f p/w chest discomfort, rash itching after taking second dose of bactrim for presumed sinusitis  VSS, nontoxic   lungs cta, abd soft, ntnd,   no e/o angioedema or anaphylaxis   ekg labs cxr unremarkable, pt w/ likely allergic reaction to meds   pt to f/u w/ pcp for further eval and mgmt

## 2018-05-01 NOTE — ED PROVIDER NOTE - OBJECTIVE STATEMENT
62 y female presents with sinus congestion x 2 weeks, was on augmentin x 10 days, flonase and claritin,  rxd by her pmd Dr Jorge,  symptoms had not improved much, was seen yesterday, was started on bactrim,  had 1st dose yesterday,  last night she felt warm, and itchy,  did not take the second dose yesterday, this morning took a dose of bactrim,  went to work, began to feel warm, flushed, itchy,  called her pmd office, was told not to take any more bactrim , and to drinks plenty of water,  shortly after began to feel chest tightness, nausea, no sob, no cough, no vomiting.  called her pmd office, was told to come to ed.  pmh mvp, htn.   Cardiologist Dr Samayoa

## 2018-05-01 NOTE — ED PROVIDER NOTE - CHPI ED SYMPTOMS NEG
no chills/no shortness of breath/no back pain/no fever/no cough/no syncope/no dizziness/no vomiting/no diaphoresis

## 2018-05-01 NOTE — ED PROVIDER NOTE - PROGRESS NOTE DETAILS
patient resting comfortably, results discussed, states she has ENT, Dr Alvarez will follow up , advised no abx, copy of results given, rx for benadryl and pepcid sent to pharmacy,  advised follow up with pmd dr Jorge, advised if any concerns, or condition worsens return to ed

## 2019-03-10 ENCOUNTER — TRANSCRIPTION ENCOUNTER (OUTPATIENT)
Age: 64
End: 2019-03-10

## 2021-05-25 NOTE — ED PROVIDER NOTE - DURATION
today Medical Necessity Statement: Based on the clinical exam, the pathology, the patient's preference and my medical judgement, Mohs surgery is the most appropriate treatment for this cancer compared to other treatments.

## 2021-06-10 PROBLEM — I34.1 NONRHEUMATIC MITRAL (VALVE) PROLAPSE: Chronic | Status: ACTIVE | Noted: 2017-06-19

## 2021-06-10 PROBLEM — I10 ESSENTIAL (PRIMARY) HYPERTENSION: Chronic | Status: ACTIVE | Noted: 2017-06-19

## 2021-06-15 ENCOUNTER — OUTPATIENT (OUTPATIENT)
Dept: OUTPATIENT SERVICES | Facility: HOSPITAL | Age: 66
LOS: 1 days | End: 2021-06-15
Payer: COMMERCIAL

## 2021-06-15 VITALS
SYSTOLIC BLOOD PRESSURE: 140 MMHG | HEIGHT: 66 IN | OXYGEN SATURATION: 97 % | DIASTOLIC BLOOD PRESSURE: 72 MMHG | RESPIRATION RATE: 16 BRPM | TEMPERATURE: 97 F | WEIGHT: 136.91 LBS | HEART RATE: 64 BPM

## 2021-06-15 DIAGNOSIS — N94.89 OTHER SPECIFIED CONDITIONS ASSOCIATED WITH FEMALE GENITAL ORGANS AND MENSTRUAL CYCLE: ICD-10-CM

## 2021-06-15 DIAGNOSIS — Z98.890 OTHER SPECIFIED POSTPROCEDURAL STATES: Chronic | ICD-10-CM

## 2021-06-15 DIAGNOSIS — Z98.49 CATARACT EXTRACTION STATUS, UNSPECIFIED EYE: Chronic | ICD-10-CM

## 2021-06-15 DIAGNOSIS — Z01.818 ENCOUNTER FOR OTHER PREPROCEDURAL EXAMINATION: ICD-10-CM

## 2021-06-15 DIAGNOSIS — Z90.89 ACQUIRED ABSENCE OF OTHER ORGANS: Chronic | ICD-10-CM

## 2021-06-15 DIAGNOSIS — Z98.891 HISTORY OF UTERINE SCAR FROM PREVIOUS SURGERY: Chronic | ICD-10-CM

## 2021-06-15 LAB
ALBUMIN SERPL ELPH-MCNC: 3.7 G/DL — SIGNIFICANT CHANGE UP (ref 3.3–5)
ALP SERPL-CCNC: 76 U/L — SIGNIFICANT CHANGE UP (ref 40–120)
ALT FLD-CCNC: 16 U/L — SIGNIFICANT CHANGE UP (ref 12–78)
ANION GAP SERPL CALC-SCNC: 5 MMOL/L — SIGNIFICANT CHANGE UP (ref 5–17)
AST SERPL-CCNC: 11 U/L — LOW (ref 15–37)
BILIRUB SERPL-MCNC: 0.5 MG/DL — SIGNIFICANT CHANGE UP (ref 0.2–1.2)
BUN SERPL-MCNC: 11 MG/DL — SIGNIFICANT CHANGE UP (ref 7–23)
CALCIUM SERPL-MCNC: 8.9 MG/DL — SIGNIFICANT CHANGE UP (ref 8.5–10.1)
CHLORIDE SERPL-SCNC: 97 MMOL/L — SIGNIFICANT CHANGE UP (ref 96–108)
CO2 SERPL-SCNC: 35 MMOL/L — HIGH (ref 22–31)
CREAT SERPL-MCNC: 0.67 MG/DL — SIGNIFICANT CHANGE UP (ref 0.5–1.3)
GLUCOSE SERPL-MCNC: 101 MG/DL — HIGH (ref 70–99)
HCT VFR BLD CALC: 37.9 % — SIGNIFICANT CHANGE UP (ref 34.5–45)
HGB BLD-MCNC: 12.5 G/DL — SIGNIFICANT CHANGE UP (ref 11.5–15.5)
MCHC RBC-ENTMCNC: 33 GM/DL — SIGNIFICANT CHANGE UP (ref 32–36)
MCHC RBC-ENTMCNC: 33.4 PG — SIGNIFICANT CHANGE UP (ref 27–34)
MCV RBC AUTO: 101.3 FL — HIGH (ref 80–100)
NRBC # BLD: 0 /100 WBCS — SIGNIFICANT CHANGE UP (ref 0–0)
PLATELET # BLD AUTO: 269 K/UL — SIGNIFICANT CHANGE UP (ref 150–400)
POTASSIUM SERPL-MCNC: 3.8 MMOL/L — SIGNIFICANT CHANGE UP (ref 3.5–5.3)
POTASSIUM SERPL-SCNC: 3.8 MMOL/L — SIGNIFICANT CHANGE UP (ref 3.5–5.3)
PROT SERPL-MCNC: 7.1 G/DL — SIGNIFICANT CHANGE UP (ref 6–8.3)
RBC # BLD: 3.74 M/UL — LOW (ref 3.8–5.2)
RBC # FLD: 14.4 % — SIGNIFICANT CHANGE UP (ref 10.3–14.5)
SODIUM SERPL-SCNC: 137 MMOL/L — SIGNIFICANT CHANGE UP (ref 135–145)
WBC # BLD: 6.21 K/UL — SIGNIFICANT CHANGE UP (ref 3.8–10.5)
WBC # FLD AUTO: 6.21 K/UL — SIGNIFICANT CHANGE UP (ref 3.8–10.5)

## 2021-06-15 PROCEDURE — 80053 COMPREHEN METABOLIC PANEL: CPT

## 2021-06-15 PROCEDURE — 93010 ELECTROCARDIOGRAM REPORT: CPT

## 2021-06-15 PROCEDURE — 93005 ELECTROCARDIOGRAM TRACING: CPT

## 2021-06-15 PROCEDURE — 85027 COMPLETE CBC AUTOMATED: CPT

## 2021-06-15 PROCEDURE — 36415 COLL VENOUS BLD VENIPUNCTURE: CPT

## 2021-06-15 PROCEDURE — G0463: CPT

## 2021-06-15 RX ORDER — CLONAZEPAM 1 MG
0 TABLET ORAL
Qty: 0 | Refills: 0 | DISCHARGE

## 2021-06-15 RX ORDER — VALACYCLOVIR 500 MG/1
0 TABLET, FILM COATED ORAL
Qty: 0 | Refills: 0 | DISCHARGE

## 2021-06-15 NOTE — H&P PST ADULT - ATTENDING COMMENTS
Patient admitted to [facility] for resection of painful sebaceous cysts and vulva biopsies. No change in status since initial visit.

## 2021-06-15 NOTE — H&P PST ADULT - NSICDXFAMILYHX_GEN_ALL_CORE_FT
FAMILY HISTORY:  Father  Still living? No  Family history of CVA, Age at diagnosis: Age Unknown  FH: heart disease, Age at diagnosis: Age Unknown  FH: type 2 diabetes, Age at diagnosis: Age Unknown    Mother  Still living? No  FH: colon cancer, Age at diagnosis: Age Unknown    Sibling  Still living? No  FH: colon cancer, Age at diagnosis: Age Unknown

## 2021-06-15 NOTE — H&P PST ADULT - HISTORY OF PRESENT ILLNESS
66 yo female with HTN scheduled for        Patient states she had the cyst in her vaginal area for one year, pain is worse after sitting and intermittently 5/10 64 yo female with HTN COPD Barretts Esophagus Reflux  IBS Migraines scheduled for Excision of Vulva Lesions on 6/24/21 with Dr Carias.   Patient states she had the cyst in her vaginal area for one year, pain is worse after sitting and intermittently 5/10.

## 2021-06-15 NOTE — H&P PST ADULT - ASSESSMENT
66 yo female with HTN COPD Barretts Esophagus Reflux  IBS Migraines scheduled for Excision of Vulva Lesions on 6/24/21 with Dr Carias

## 2021-06-15 NOTE — H&P PST ADULT - NSICDXPASTSURGICALHX_GEN_ALL_CORE_FT
PAST SURGICAL HISTORY:  H/O  section x3    H/O excision of mass left shoulder    S/P cataract surgery 5 years ago    S/P rotator cuff repair left 2019    S/P tonsillectomy age 3

## 2021-06-15 NOTE — H&P PST ADULT - NSICDXPASTMEDICALHX_GEN_ALL_CORE_FT
PAST MEDICAL HISTORY:  Acid reflux     Anxiety     Barretts esophagus     Chronic obstructive pulmonary disease (COPD)     H/O emphysema     Hiatal hernia     HTN (hypertension)     Irritable bowel syndrome     Migraines once a week    Mitral valve prolapse     Neck pain treated with monthly trigger point injections

## 2021-06-15 NOTE — H&P PST ADULT - VENOUS THROMBOEMBOLISM CURRENT STATUS
(0) indicator not present (0) indicator not present/(1) history of inflammatory bowel disease (IBD)/(1) other risk factor (includes escalating BMI, pack-years of smoking, diabetes requiring insulin, chemotherapy, female gender and length of surgery)

## 2021-06-15 NOTE — H&P PST ADULT - NSANTHOSAYNRD_GEN_A_CORE
No. VONNIE screening performed.  STOP BANG Legend: 0-2 = LOW Risk; 3-4 = INTERMEDIATE Risk; 5-8 = HIGH Risk

## 2021-06-15 NOTE — H&P PST ADULT - NSICDXPROBLEM_GEN_ALL_CORE_FT
PROBLEM DIAGNOSES  Problem: Other specified conditions associated with female genital organs and menstrual cycle  Assessment and Plan: Check Labs cbc cmp  ekg  medical clearance  peop instructions were givne  6/17 stop multivitamin and elderberry  day  of surgery use Xiidra Opthalmic and inhalers as prescribed  also take Claritin inderal Norvasc hycosamine Protonix Zofran with a tiny sip of water   smoking cessation discussed with patient   written information for follow up given  patient has had 2 covid vaccines no need for testing  patient verbalized understanding of instructions

## 2021-06-23 ENCOUNTER — TRANSCRIPTION ENCOUNTER (OUTPATIENT)
Age: 66
End: 2021-06-23

## 2021-06-23 NOTE — [facility]SU PATIENT PROFILE, ADULT - PSH
H/O  section  x3  H/O excision of mass  left shoulder  S/P cataract surgery  5 years ago  S/P rotator cuff repair  left 2019  S/P tonsillectomy  age 3

## 2021-06-23 NOTE — [facility]SU PATIENT PROFILE, ADULT - PMH
Acid reflux    Anxiety    Barretts esophagus    Chronic obstructive pulmonary disease (COPD)    H/O emphysema    Hiatal hernia    HTN (hypertension)    Irritable bowel syndrome    Migraines  once a week  Mitral valve prolapse    Neck pain  treated with monthly trigger point injections  Other specified conditions associated with female genital organs and menstrual cycle

## 2021-06-24 ENCOUNTER — RESULT REVIEW (OUTPATIENT)
Age: 66
End: 2021-06-24

## 2021-06-24 ENCOUNTER — OUTPATIENT (OUTPATIENT)
Dept: OUTPATIENT SERVICES | Facility: HOSPITAL | Age: 66
LOS: 1 days | End: 2021-06-24
Payer: COMMERCIAL

## 2021-06-24 VITALS
SYSTOLIC BLOOD PRESSURE: 117 MMHG | OXYGEN SATURATION: 99 % | HEART RATE: 68 BPM | DIASTOLIC BLOOD PRESSURE: 46 MMHG | RESPIRATION RATE: 14 BRPM

## 2021-06-24 VITALS
SYSTOLIC BLOOD PRESSURE: 119 MMHG | DIASTOLIC BLOOD PRESSURE: 54 MMHG | TEMPERATURE: 98 F | RESPIRATION RATE: 16 BRPM | OXYGEN SATURATION: 97 % | HEART RATE: 71 BPM | WEIGHT: 136.91 LBS | HEIGHT: 66 IN

## 2021-06-24 DIAGNOSIS — Z98.890 OTHER SPECIFIED POSTPROCEDURAL STATES: Chronic | ICD-10-CM

## 2021-06-24 DIAGNOSIS — Z98.49 CATARACT EXTRACTION STATUS, UNSPECIFIED EYE: Chronic | ICD-10-CM

## 2021-06-24 DIAGNOSIS — Z98.891 HISTORY OF UTERINE SCAR FROM PREVIOUS SURGERY: Chronic | ICD-10-CM

## 2021-06-24 DIAGNOSIS — N94.89 OTHER SPECIFIED CONDITIONS ASSOCIATED WITH FEMALE GENITAL ORGANS AND MENSTRUAL CYCLE: ICD-10-CM

## 2021-06-24 DIAGNOSIS — Z90.89 ACQUIRED ABSENCE OF OTHER ORGANS: Chronic | ICD-10-CM

## 2021-06-24 PROCEDURE — 88304 TISSUE EXAM BY PATHOLOGIST: CPT | Mod: 26

## 2021-06-24 PROCEDURE — 11423 EXC H-F-NK-SP B9+MARG 2.1-3: CPT

## 2021-06-24 PROCEDURE — 88304 TISSUE EXAM BY PATHOLOGIST: CPT

## 2021-06-24 RX ORDER — ONDANSETRON 8 MG/1
4 TABLET, FILM COATED ORAL ONCE
Refills: 0 | Status: DISCONTINUED | OUTPATIENT
Start: 2021-06-24 | End: 2021-06-24

## 2021-06-24 RX ORDER — SODIUM CHLORIDE 9 MG/ML
1000 INJECTION, SOLUTION INTRAVENOUS
Refills: 0 | Status: DISCONTINUED | OUTPATIENT
Start: 2021-06-24 | End: 2021-06-24

## 2021-06-24 RX ORDER — OXYCODONE HYDROCHLORIDE 5 MG/1
5 TABLET ORAL ONCE
Refills: 0 | Status: DISCONTINUED | OUTPATIENT
Start: 2021-06-24 | End: 2021-06-24

## 2021-06-24 RX ORDER — HYDROMORPHONE HYDROCHLORIDE 2 MG/ML
0.5 INJECTION INTRAMUSCULAR; INTRAVENOUS; SUBCUTANEOUS
Refills: 0 | Status: DISCONTINUED | OUTPATIENT
Start: 2021-06-24 | End: 2021-06-24

## 2021-06-24 RX ADMIN — SODIUM CHLORIDE 60 MILLILITER(S): 9 INJECTION, SOLUTION INTRAVENOUS at 06:58

## 2021-06-24 RX ADMIN — SODIUM CHLORIDE 75 MILLILITER(S): 9 INJECTION, SOLUTION INTRAVENOUS at 09:45

## 2021-06-24 NOTE — [facility] DISCHARGE PLAN (ADULT/PEDIATRIC) - ACTIVITY LEVEL
No heavy lifting/No sports/gym/No weight bearing/Nothing per rectum/Nothing per vagina/No tub baths/No douching/No tampons/No intercourse

## 2021-06-24 NOTE — BRIEF OPERATIVE NOTE - LESION SIZE
1-left labium 1 cm excision square; 2-right introitus bx 5 mm; 3-left introitus bx 5 mm; 4-2.0 cm square of tissue as deborah shape from right labium. due to the laxity of the skin these lesions were much smaller following excision on telfa submitted to path.

## 2021-06-24 NOTE — ASU DISCHARGE PLAN (ADULT/PEDIATRIC) - CARE PROVIDER_API CALL
Jessica Carias)  Obstetrics and Gynecology  [address] [place] [address]  Phone: [phone]  Fax: [phone]  Follow Up Time:

## 2021-06-24 NOTE — ASU DISCHARGE PLAN (ADULT/PEDIATRIC) - ASU DC SPECIAL INSTRUCTIONSFT
These are specific for your needs. Please do them all. If you aren't sure what to do please call me immediately, wound care is very important here. My cell is .     1. You MUST use ice, several crushed cubes into a latex glove, every 20 min on and off while awake for 48 hours. This will greatly reduce pain and swelling.     2. You MUSt begin your 3 X daily sitz baths this afternoon. You MUST thickly cover the sutures and the inner vulva biopsy sites with the bacitracin.     3. When you use the bathroom for urine or stool you MUSt gently irrigate the biopsied and repaired sites with a sloppy tissue, pat them dry, and re apply bacitracin generously.     4. Do NOT wear a pad. loose fitting underwear or none is best, you can sagar "non stick" gauze to put over the area to protect your clothing.     5. See me thesday or wednesday for a post op check, pls call to make this appointment.     Obviously, call me right away if fever, chills, bleeding, or pain.

## 2021-06-24 NOTE — BRIEF OPERATIVE NOTE - OPERATION/FINDINGS
Eua was performed and showed no unusual pelvic masses. On right labia majora were 3 grouped large 1 cm each sebacdous cysts, which when removed shrunk to about half size, grouped within few mm of each other; another 6-7 mm pair lf same were located in the left labia majora. These were excised and repaired in multiple layers to close defect. small biopsies were taken from introitus at 3 and 9 ockock and also primarily repaired. No complications were noted, and all counts correct X 3. Marcaine 0.5%  used for case with sedation, good results noted.

## 2021-06-24 NOTE — ASU DISCHARGE PLAN (ADULT/PEDIATRIC) - PAIN MANAGEMENT
you have percocet at home as directed. If you only need tylenol 2 tabs every 4 hours that would be fine as well.

## 2021-06-24 NOTE — ASU DISCHARGE PLAN (ADULT/PEDIATRIC) - CALL YOUR DOCTOR IF YOU HAVE ANY OF THE FOLLOWING:
Bleeding that does not stop/Swelling that gets worse/Pain not relieved by Medications/Wound/Surgical Site with redness, or foul smelling discharge or pus/Nausea and vomiting that does not stop/Unable to urinate/Inability to tolerate liquids or foods/Increased irritability or sluggishness

## 2021-06-24 NOTE — ASU DISCHARGE PLAN (ADULT/PEDIATRIC) - SITZ BATH DURATION DAY(S)
please take sitz baths to cover vulva lesions as well in warm water 3 X daily until all sutures fall  out

## 2021-06-24 NOTE — ASU PREOP CHECKLIST - WEIGHT IN KG
CARDIOLOGY FOLLOW-UP     Patient:  Alice Witt  YOB: 1940  Date of Visit:  12/12/19    Referring Provider :   Sumit Sadler DO    Chief Complaint   Patient presents with   • Follow-up   • Atrial Fibrillation       HPI: Last cardiovascular follow-up visit was October 17, 2019.  Closely monitoring hypertension secondary to weight loss, and poor appetite after death of .  History of hypertension, hyperlipidemia, anticoagulation secondary to an episode of PAF with elevated chads 2 vascular score.  Also patient on antiarrhythmic therapy specifically sotalol.  Breast Ca - left mastectomy with chemotherapy and radiation.  This was approximately 5 years ago.  Cardiovascular risk factors:     hypertension , elevated cholesterol, obesity, age greater than 55 in a woman and atrial fibrillation.     No formal exercise regimen.  CATH/CABG:None  Vascular studies: No recent  Ischemic evaluation: She had an ischemic evaluation in 2017 which was unremarkable with normal LVEF.  TTE: No recent.     Past Medical History:   Diagnosis Date   • Chronic atrial fibrillation 4/10/2019   • Chronic atrial fibrillation 5/12/2017   • Dizziness    • Encounter for monitoring sotalol therapy    • Essential hypertension    • Mixed hyperlipidemia    • Paroxysmal atrial fibrillation (CMS/HCC)      Results for ALICE WITT (MRN [id number]) as of 12/12/2019 08:59   Ref. Range 11/5/2019 10:05   CHOLESTEROL Latest Ref Range: <200 mg/dL 252 (H)   CALCULATED LDL Latest Ref Range: <130 mg/dL 158 (H)   HDL Latest Ref Range: >49 mg/dL 64   TRIGLYCERIDE Latest Ref Range: <150 mg/dL 151 (H)   CALCULATED NON HDL Latest Units: mg/dL 188   CHOL/HDL Latest Ref Range: <4.5  3.9       Results for orders placed or performed in visit on 10/17/19   ELECTROCARDIOGRAM 12-LEAD    Impression    Sinus rhythm with normal ME interval.  Intraventricular conduction delay.  Nonspecific ST abnormalities.  Axis slightly leftward.          ALLERGIES:   Allergen Reactions   • Prochlorperazine HIVES       Current Outpatient Medications   Medication Sig   • venlafaxine (EFFEXOR) 75 MG tablet TAKE 1 TABLET DAILY WITH FOOD   • atorvastatin (LIPITOR) 10 MG tablet Take 1 tablet by mouth daily.   • glimepiride (AMARYL) 1 MG tablet TAKE 1 TABLET DAILY   • losartan (COZAAR) 100 MG tablet TAKE 1 TABLET DAILY (IT REPLACES EXFORGE)   • amLODIPine (NORVASC) 10 MG tablet TAKE 1 TABLET DAILY (IT REPLACES EXFORGE)   • ALPRAZolam (XANAX) 0.5 MG tablet TAKE 1 TABLET BY MOUTH THREE TIMES DAILY AS NEEDED FOR SLEEP   • Calcium Carbonate-Vitamin D (CALCIUM, OYSTER SHELL,-VITAMIN D) 500-200 MG-UNIT tablet Take 1 tablet by mouth 2 times daily.   • sotalol (BETAPACE) 80 MG tablet TAKE 1 TABLET TWICE A DAY   • blood glucose (ONE TOUCH ULTRA TEST) test strip Test blood sugar 1 time daily as directed. Diagnosis: E11.9   • ONETOUCH DELICA LANCETS 33G Misc USE EVERY DAY AS DIRECTED   • warfarin (COUMADIN) 5 MG tablet Take 1 tablet by mouth daily. Tuesday, Thursday, Saturday and sunday   • apixaBAN (ELIQUIS) 5 MG Tab Take 1 tablet by mouth every 12 hours.     No current facility-administered medications for this visit.         Review of Systems   Constitutional: Positive for unexpected weight change (Poor appetite and weight loss.). Negative for activity change, appetite change and fever.        Uses a cane   HENT: Negative for dental problem, ear pain, facial swelling, postnasal drip and sinus pain.    Eyes: Negative for pain and discharge.   Respiratory: Negative for apnea, chest tightness, shortness of breath and wheezing.    Cardiovascular: Negative for chest pain, palpitations and leg swelling.   Gastrointestinal: Negative for abdominal distention, abdominal pain, blood in stool, constipation, diarrhea and vomiting.   Endocrine: Negative for cold intolerance, heat intolerance and polyuria.   Genitourinary: Negative for decreased urine volume, difficulty urinating and  flank pain.   Musculoskeletal: Negative for arthralgias, back pain, joint swelling and neck pain.   Skin: Negative for color change.   Allergic/Immunologic: Negative for environmental allergies and immunocompromised state.   Neurological: Negative for syncope, facial asymmetry, speech difficulty, weakness, light-headedness, numbness and headaches.   Psychiatric/Behavioral: Negative for confusion, hallucinations and sleep disturbance.        Depression       Vitals:    12/12/19 0849 12/12/19 0852   BP: 120/72 110/70   Pulse: 62    SpO2: 94%    Weight: 83.9 kg (185 lb)    Height: 5' 3\" (1.6 m)         Physical Exam   Constitutional: She appears healthy.   Sad affect   HENT:   Mouth/Throat: Oropharynx is clear. Pharynx is normal.   Eyes: Pupils are equal, round, and reactive to light. Conjunctivae are normal.   Neck: No JVD present. No neck adenopathy. No thyromegaly present.   Cardiovascular: Normal rate, regular rhythm, S1 normal, S2 normal, normal heart sounds and intact distal pulses. PMI is not displaced.   No murmur (soft early EDISON) heard.  Pulses:       Dorsalis pedis pulses are 1+ on the right side and 1+ on the left side.        Posterior tibial pulses are 1+ on the right side and 1+ on the left side.   No bruits   Pulmonary/Chest: Effort normal and breath sounds normal. No stridor. She has no wheezes. She has no rales. She exhibits no tenderness.   Abdominal: Soft. Bowel sounds are normal. She exhibits no distension and no mass. There is no hepatomegaly. There is no tenderness. Musculoskeletal:         General: No edema (bilateral lymphedema).     Neurological: She is alert and oriented to person, place, and time.   Skin: Skin is warm.        Laboratory Results:  No components found for: 64W    Chest X-ray: None recently        Assessment/Plan:  Anticoagulation goal of INR 2 to 3  Continue with warfarin. Fluctuations secondary to diet.  Discussion, on using 1 of the newer anticoagulants.  At this point, I  sent a prescription to pharmacy for 5 mg of Eliquis twice daily.  She will evaluate the cost and call us.    Encounter for monitoring sotalol therapy  Continue on sinus rhythm, QTC is appropriate.  Continue on current dose.    Essential hypertension  Hypertension is controlled.  We will continue current dose of losartan a day.    Mixed hyperlipidemia  Lipid abnormalities are this coming year, will reevaluate lipid panel.  We can see, if we can decrease dose of Lipitor.    Paroxysmal atrial fibrillation (CMS/HCC)  Appears to be in sinus rhythm on examination but with elevated chads 2 vascular score of over 3.0, necessitating anticoagulation.      Summary: At this point, patient continues to be in the morning.  But much improved.  At least eating habits are more consistent.  Continue with anticoagulation.  She is in sinus rhythm.  Continue with BP medications.  As well as sotalol.  Her last QTC was adequate.  She will follow-up with me in the spring.  I have ordered for her to have a lipid cholesterol number so that we can see if we can decrease her atorvastatin.  I have also transmitted an order for Eliquis to the pharmacy, and if the cost is right, she will be able to switch.  I think this would be great, since she lives alone at this point.      Return in about 6 months (around 6/12/2020).        Ashwini Jensen MD      Greater than 50% of the visit was spent counseling regarding above issues; total time spent 40 minutes.        62.1

## 2021-06-25 LAB — SURGICAL PATHOLOGY STUDY: SIGNIFICANT CHANGE UP

## 2021-12-17 ENCOUNTER — EMERGENCY (EMERGENCY)
Facility: HOSPITAL | Age: 66
LOS: 1 days | Discharge: HOME | End: 2021-12-17
Attending: EMERGENCY MEDICINE | Admitting: EMERGENCY MEDICINE
Payer: MEDICARE

## 2021-12-17 VITALS
SYSTOLIC BLOOD PRESSURE: 140 MMHG | DIASTOLIC BLOOD PRESSURE: 74 MMHG | OXYGEN SATURATION: 98 % | RESPIRATION RATE: 16 BRPM | TEMPERATURE: 98 F | HEART RATE: 84 BPM

## 2021-12-17 VITALS
HEIGHT: 66 IN | TEMPERATURE: 97 F | SYSTOLIC BLOOD PRESSURE: 119 MMHG | RESPIRATION RATE: 16 BRPM | DIASTOLIC BLOOD PRESSURE: 72 MMHG | WEIGHT: 136.91 LBS | OXYGEN SATURATION: 99 % | HEART RATE: 81 BPM

## 2021-12-17 VITALS
OXYGEN SATURATION: 100 % | TEMPERATURE: 100 F | DIASTOLIC BLOOD PRESSURE: 79 MMHG | HEIGHT: 66 IN | HEART RATE: 79 BPM | SYSTOLIC BLOOD PRESSURE: 138 MMHG | WEIGHT: 139.99 LBS | RESPIRATION RATE: 18 BRPM

## 2021-12-17 DIAGNOSIS — Z98.49 CATARACT EXTRACTION STATUS, UNSPECIFIED EYE: Chronic | ICD-10-CM

## 2021-12-17 DIAGNOSIS — Z90.89 ACQUIRED ABSENCE OF OTHER ORGANS: Chronic | ICD-10-CM

## 2021-12-17 DIAGNOSIS — Z98.890 OTHER SPECIFIED POSTPROCEDURAL STATES: Chronic | ICD-10-CM

## 2021-12-17 DIAGNOSIS — Z98.891 HISTORY OF UTERINE SCAR FROM PREVIOUS SURGERY: Chronic | ICD-10-CM

## 2021-12-17 PROBLEM — K21.9 GASTRO-ESOPHAGEAL REFLUX DISEASE WITHOUT ESOPHAGITIS: Chronic | Status: ACTIVE | Noted: 2021-06-15

## 2021-12-17 PROBLEM — K58.9 IRRITABLE BOWEL SYNDROME WITHOUT DIARRHEA: Chronic | Status: ACTIVE | Noted: 2021-06-15

## 2021-12-17 PROBLEM — K22.70 BARRETT'S ESOPHAGUS WITHOUT DYSPLASIA: Chronic | Status: ACTIVE | Noted: 2021-06-15

## 2021-12-17 PROBLEM — F41.9 ANXIETY DISORDER, UNSPECIFIED: Chronic | Status: ACTIVE | Noted: 2021-06-15

## 2021-12-17 PROBLEM — J44.9 CHRONIC OBSTRUCTIVE PULMONARY DISEASE, UNSPECIFIED: Chronic | Status: ACTIVE | Noted: 2021-06-15

## 2021-12-17 PROBLEM — K44.9 DIAPHRAGMATIC HERNIA WITHOUT OBSTRUCTION OR GANGRENE: Chronic | Status: ACTIVE | Noted: 2021-06-15

## 2021-12-17 PROBLEM — N94.89 OTHER SPECIFIED CONDITIONS ASSOCIATED WITH FEMALE GENITAL ORGANS AND MENSTRUAL CYCLE: Chronic | Status: ACTIVE | Noted: 2021-06-15

## 2021-12-17 PROBLEM — G43.909 MIGRAINE, UNSPECIFIED, NOT INTRACTABLE, WITHOUT STATUS MIGRAINOSUS: Chronic | Status: ACTIVE | Noted: 2021-06-15

## 2021-12-17 PROBLEM — Z87.09 PERSONAL HISTORY OF OTHER DISEASES OF THE RESPIRATORY SYSTEM: Chronic | Status: ACTIVE | Noted: 2021-06-15

## 2021-12-17 PROBLEM — M54.2 CERVICALGIA: Chronic | Status: ACTIVE | Noted: 2021-06-15

## 2021-12-17 PROCEDURE — 71250 CT THORAX DX C-: CPT | Mod: MA

## 2021-12-17 PROCEDURE — 73000 X-RAY EXAM OF COLLAR BONE: CPT

## 2021-12-17 PROCEDURE — 70450 CT HEAD/BRAIN W/O DYE: CPT | Mod: MA

## 2021-12-17 PROCEDURE — 99284 EMERGENCY DEPT VISIT MOD MDM: CPT

## 2021-12-17 PROCEDURE — 99285 EMERGENCY DEPT VISIT HI MDM: CPT

## 2021-12-17 PROCEDURE — 99283 EMERGENCY DEPT VISIT LOW MDM: CPT | Mod: 25

## 2021-12-17 PROCEDURE — 73030 X-RAY EXAM OF SHOULDER: CPT | Mod: 26,RT

## 2021-12-17 PROCEDURE — 71250 CT THORAX DX C-: CPT | Mod: 26,MA

## 2021-12-17 PROCEDURE — 70450 CT HEAD/BRAIN W/O DYE: CPT | Mod: 26,MA

## 2021-12-17 PROCEDURE — 73030 X-RAY EXAM OF SHOULDER: CPT

## 2021-12-17 PROCEDURE — 73000 X-RAY EXAM OF COLLAR BONE: CPT | Mod: 26,RT

## 2021-12-17 PROCEDURE — 99284 EMERGENCY DEPT VISIT MOD MDM: CPT | Mod: 25

## 2021-12-17 RX ORDER — OXYCODONE AND ACETAMINOPHEN 5; 325 MG/1; MG/1
1 TABLET ORAL ONCE
Refills: 0 | Status: DISCONTINUED | OUTPATIENT
Start: 2021-12-17 | End: 2021-12-17

## 2021-12-17 RX ORDER — LIDOCAINE 4 G/100G
1 CREAM TOPICAL ONCE
Refills: 0 | Status: COMPLETED | OUTPATIENT
Start: 2021-12-17 | End: 2021-12-17

## 2021-12-17 RX ORDER — LIDOCAINE 4 G/100G
1 CREAM TOPICAL
Qty: 7 | Refills: 0
Start: 2021-12-17 | End: 2021-12-23

## 2021-12-17 RX ORDER — IBUPROFEN 200 MG
600 TABLET ORAL ONCE
Refills: 0 | Status: COMPLETED | OUTPATIENT
Start: 2021-12-17 | End: 2021-12-17

## 2021-12-17 RX ORDER — ACETAMINOPHEN 500 MG
650 TABLET ORAL ONCE
Refills: 0 | Status: DISCONTINUED | OUTPATIENT
Start: 2021-12-17 | End: 2021-12-17

## 2021-12-17 RX ORDER — IBUPROFEN 200 MG
1 TABLET ORAL
Qty: 21 | Refills: 0
Start: 2021-12-17 | End: 2021-12-23

## 2021-12-17 RX ADMIN — Medication 600 MILLIGRAM(S): at 14:00

## 2021-12-17 RX ADMIN — LIDOCAINE 1 PATCH: 4 CREAM TOPICAL at 13:33

## 2021-12-17 RX ADMIN — Medication 600 MILLIGRAM(S): at 13:33

## 2021-12-17 RX ADMIN — OXYCODONE AND ACETAMINOPHEN 1 TABLET(S): 5; 325 TABLET ORAL at 06:52

## 2021-12-17 RX ADMIN — OXYCODONE AND ACETAMINOPHEN 1 TABLET(S): 5; 325 TABLET ORAL at 14:00

## 2021-12-17 RX ADMIN — OXYCODONE AND ACETAMINOPHEN 1 TABLET(S): 5; 325 TABLET ORAL at 13:33

## 2021-12-17 NOTE — ED ADULT NURSE NOTE - NSICDXPASTMEDICALHX_GEN_ALL_CORE_FT
PAST MEDICAL HISTORY:  Acid reflux     Anxiety     Barretts esophagus     Chronic obstructive pulmonary disease (COPD)     H/O emphysema     Hiatal hernia     HTN (hypertension)     Irritable bowel syndrome     Migraines once a week    Mitral valve prolapse     Neck pain treated with monthly trigger point injections    Other specified conditions associated with female genital organs and menstrual cycle

## 2021-12-17 NOTE — ED PROVIDER NOTE - NSFOLLOWUPINSTRUCTIONS_ED_ALL_ED_FT
Clavicle Fracture    WHAT YOU NEED TO KNOW:    A clavicle fracture is a crack or break in your clavicle (collarbone).     Shoulder Anatomy         DISCHARGE INSTRUCTIONS:    Return to the emergency department if:   •Your shoulder, arm, hand, or fingers turn blue or pale, or feel cold or numb.      •Your pain gets worse, even after rest and medicine.      •Your splint feels tight, or you have increased swelling.      •You cannot move your fingers.      Call your doctor if:   •Your sling or wrap comes off or gets damaged.      •You have questions or concerns about your condition or care.      Medicines: You may need any of the following:   •Acetaminophen decreases pain and fever. It is available without a doctor's order. Ask how much to take and how often to take it. Follow directions. Read the labels of all other medicines you are using to see if they also contain acetaminophen, or ask your doctor or pharmacist. Acetaminophen can cause liver damage if not taken correctly. Do not use more than 4 grams (4,000 milligrams) total of acetaminophen in one day.       •NSAIDs, such as ibuprofen, help decrease swelling, pain, and fever. This medicine is available with or without a doctor's order. NSAIDs can cause stomach bleeding or kidney problems in certain people. If you take blood thinner medicine, always ask your healthcare provider if NSAIDs are safe for you. Always read the medicine label and follow directions.      •Take your medicine as directed. Contact your healthcare provider if you think your medicine is not helping or if you have side effects. Tell him or her if you are allergic to any medicine. Keep a list of the medicines, vitamins, and herbs you take. Include the amounts, and when and why you take them. Bring the list or the pill bottles to follow-up visits. Carry your medicine list with you in case of an emergency.      Sling or brace care: You will have a sling or a brace to keep your clavicle from moving while it heals. Ask your healthcare provider for more information on how to care for the sling or brace, including how to adjust it.    Shoulder Sling         Apply ice: Apply ice on your clavicle for 15 to 20 minutes every hour or as directed. Use an ice pack, or put crushed ice in a plastic bag. Cover the bag with a towel before you apply it to your clavicle. Ice decreases swelling and pain.    Activity: Limit activity as directed by your healthcare provider. Slowly start to do more each day as the pain decreases.    Physical therapy: Physical therapy may be recommended after your clavicle heals. A physical therapist teaches you exercises to help improve movement and strength, and to decrease pain.    Follow up with your doctor within 1 week or as directed: You may need to return for more x-rays to see how well your clavicle is healing. Write down your questions so you remember to ask them during your visits.

## 2021-12-17 NOTE — ED ADULT NURSE NOTE - NSIMPLEMENTINTERV_GEN_ALL_ED
Implemented All Fall Risk Interventions:  [place] to call system. Call bell, personal items and telephone within reach. Instruct patient to call for assistance. Room bathroom lighting operational. Non-slip footwear when patient is off stretcher. Physically safe environment: no spills, clutter or unnecessary equipment. Stretcher in lowest position, wheels locked, appropriate side rails in place. Provide visual cue, wrist band, yellow gown, etc. Monitor gait and stability. Monitor for mental status changes and reorient to person, place, and time. Review medications for side effects contributing to fall risk. Reinforce activity limits and safety measures with patient and family.

## 2021-12-17 NOTE — ED PROVIDER NOTE - CLINICAL SUMMARY MEDICAL DECISION MAKING FREE TEXT BOX
Pt is a 65 yo female s/p fall with back pain will get ct head chest pain control Sara: Pt is a 65 yo female s/p fall with back pain will get ct head chest pain control

## 2021-12-17 NOTE — ED PROVIDER NOTE - CARE PROVIDER_API CALL
Daljit Lamas)  Orthopaedic Surgery  [facility] [place] [address]  Phone: [phone]  Fax: [phone]  Follow Up Time:

## 2021-12-17 NOTE — ED PROVIDER NOTE - PATIENT PORTAL LINK FT
You can access the FollowMyHealth Patient Portal offered by [facility] by registering at the following website: [email or web address]. By joining [employer]’s FollowMyHealth portal, you will also be able to view your health information using other applications (apps) compatible with our system.

## 2021-12-17 NOTE — ED PROVIDER NOTE - MUSCULOSKELETAL, MLM
right clavicle ttp in sling TTP mid back no step off + bl parathorcic ttp nvi normal gait strength sensation

## 2021-12-17 NOTE — ED PROVIDER NOTE - NSFOLLOWUPINSTRUCTIONS_ED_ALL_ED_FT
follow up with orthopedics  return to er for any worsening symptoms    Back Pain    WHAT YOU NEED TO KNOW:    What do I need to know about back pain? Back pain is common. You may have back pain and muscle spasms. You may feel sore or stiff on one or both sides of your back. The pain may spread to your lower body. Conditions that affect the spine, joints, or muscles can cause back pain. These may include arthritis, spinal stenosis (narrowing of the spinal column), muscle tension, or breakdown of the spinal discs.    What increases my risk for back pain?   •Repeated bending, lifting, or twisting, or lifting heavy items      •Injury from a fall or accident      •Lack of regular physical activity       •Obesity or pregnancy       •Smoking      •Aging      •Driving, sitting, or standing for long periods      •Bad posture while sitting or standing      How is back pain diagnosed? Your healthcare provider will ask if you have any medical conditions. He or she may ask if you have a history of back pain and how it started. He or she may watch you stand and walk, and check your range of motion. Show him or her where you feel pain and what makes it better or worse. Describe the pain, how bad it is, and how long it lasts. Tell your provider if your pain worsens at night or when you lie on your back.    How is back pain treated?   •Medicines: ?NSAIDs, such as ibuprofen, help decrease swelling, pain, and fever. This medicine is available with or without a doctor's order. NSAIDs may be given as a pill or as a cream that is applied to your back. NSAIDs can cause stomach bleeding or kidney problems in certain people. If you take blood thinner medicine, always ask your healthcare provider if NSAIDs are safe for you. Always read the medicine label and follow directions.      ?Acetaminophen decreases pain and fever. It is available without a doctor's order. Ask how much to take and how often to take it. Follow directions. Read the labels of all other medicines you are using to see if they also contain acetaminophen, or ask your doctor or pharmacist. Acetaminophen can cause liver damage if not taken correctly. Do not use more than 4 grams (4,000 milligrams) total of acetaminophen in one day.       ?Muscle relaxers help decrease muscle spasms and back pain.      •Acupressure may be recommended to decrease pain and improve movement. Acupressure is pressure or localized massage to the area of your back pain.       •A transcutaneous electrical nerve stimulation (TENS) unit is a portable, pocket-sized, battery-powered device that attaches to your skin. It is usually placed over the area of pain. It uses mild, safe electrical signals to help control pain.      How do I manage my back pain?   •Apply ice on your back for 15 to 20 minutes every hour or as directed. Use an ice pack, or put crushed ice in a plastic bag. Cover it with a towel before you apply it to your skin. Ice helps prevent tissue damage and decreases pain.      •Apply heat on your back for 20 to 30 minutes every 2 hours for as many days as directed. Heat helps decrease pain and muscle spasms.      •Stay active as much as you can without causing more pain. Bed rest could make your back pain worse. Avoid heavy lifting until your pain is gone.      •Go to physical therapy as directed. A physical therapist can teach you exercises to help improve movement and strength, and to decrease pain.      Call your local emergency number (911 in the [place]) if:   •You have severe back pain with chest pain.      •You cannot control your urine or bowel movements.      •Your pain becomes so severe that you cannot walk.      When should I seek immediate care?   •You have pain, numbness, or weakness in one or both legs.      •You have severe back pain, nausea, and vomiting.      •You have severe back pain that spreads to your side or genital area.      When should I call my doctor?   •You have back pain that does not get better with rest and pain medicine.      •You have a fever.      •You have pain that worsens when you are on your back or when you rest.      •You have pain that worsens when you cough or sneeze.      •You lose weight without trying.      •You have questions or concerns about your condition or care.      CARE AGREEMENT:    You have the right to help plan your care. Learn about your health condition and how it may be treated. Discuss treatment options with your healthcare providers to decide what care you want to receive. You always have the right to refuse treatment.

## 2021-12-17 NOTE — ED PROVIDER NOTE - OBJECTIVE STATEMENT
67yo female who presents with right shoulder pain s/p fall. pt fell down 3 steps and hit her right side, shoulder and head, no LOC, pt c/o diffuse right shoulder pain, no dizziness, no neck or back pain no other complaints

## 2021-12-17 NOTE — ED PROVIDER NOTE - CONSTITUTIONAL, MLM
normal... Well appearing, awake, alert, oriented to person, place, time/situation and in no apparent distress. nc/at

## 2021-12-17 NOTE — ED ADULT NURSE NOTE - OBJECTIVE STATEMENT
Pt was seen here earlier for fall - dx with right clavicle fx- returned c/o of pain to back also states the pain med she was given at home (Percocet) did not help- pt with sling in place to right arm -

## 2021-12-17 NOTE — ED ADULT NURSE NOTE - IS THE PATIENT ABLE TO BE SCREENED?
No ctx, lof, bleeding, or discharge.  No HA or vision changes.  Patient is having some self-confidence issues regarding her weight gain this pregnancy.  As well as her stomach stretching.  She states she is used to being the same weight for a long time and it is bugging her that she is gaining so much weight.  She is also wondering if it is okay to exercise while pregnant. She would like to work out but her right knee has been hurting because of her weight gain.  She also has concerns about sore nipples.She is seeing [facility].   She has hx of depression but denies being on any medications for it in the past. She is not interested in taking medications at this time. She feels very supported by manny, the care guides and this clinic.     Good FM :yes    Plan today:   1 hr GTT, CBC and RPR  tdap  Hand outs given regarding parenting classes and food resources.   Discussed appropriate weight gain, healthy diet and exercise in pregnancy. Dicussed prenatal yoga and piliates. Encouraged her to discuss her concerns at [facility].   Discussed risk of PPD given hx of depression. She states understanding and we will plan for close f/u regarding this after delivery.     Alise Lang    
Please call patient and inform:  All labs within normal limits.  did not have any anemia or low blood count.  she also does not have any signs of gestational diabetes.
Yes

## 2021-12-17 NOTE — ED PROVIDER NOTE - OBJECTIVE STATEMENT
Pt is a 67 yo female with pmhx of HTN IBS anxiety GERD COPD barretts esophagus hiatal hernia migraines mvp here for mid back pain s/p fall earlier this morning. Pt was seen today for same complain had xray shoulder and clavicle fracture in sling and taking Percocet but still having back pain headache dizziness nausea denies any chest pain sob fever chills. pt is not on any blood thinners denies any numbness tingling weakness.

## 2021-12-17 NOTE — ED ADULT NURSE NOTE - NSIMPLEMENTINTERV_GEN_ALL_ED
Implemented All Fall Risk Interventions:  [place] to call system. Call bell, personal items and telephone within reach. Instruct patient to call for assistance. Room bathroom lighting operational. Non-slip footwear when patient is off stretcher. Physically safe environment: no spills, clutter or unnecessary equipment. Stretcher in lowest position, wheels locked, appropriate side rails in place. Provide visual cue, wrist band, yellow gown, etc. Monitor gait and stability. Monitor for mental status changes and reorient to person, place, and time. Review medications for side effects contributing to fall risk. Reinforce activity limits and safety measures with patient and family.

## 2021-12-17 NOTE — ED ADULT TRIAGE NOTE - CHIEF COMPLAINT QUOTE
Pt fell down 3 stairs into the door. Pt the side of her head, injured right shoulder, knee. denies taking blood thinners or LOC.

## 2021-12-17 NOTE — ED PROVIDER NOTE - PATIENT PORTAL LINK FT
You can access the FollowMyHealth Patient Portal offered by [facility] by registering at the following website: [email or web address]. By joining [employer]’s FollowMyHealth portal, you will also be able to view your health information using other applications (apps) compatible with our system.

## 2021-12-17 NOTE — ED ADULT NURSE NOTE - OBJECTIVE STATEMENT
Patient alert and oriented X 3. Complaining of right shoulder pain s/p fall. + radial pulses bilaterally. CAP refill less than 3 seconds.. Patient able to wiggle fingers. Patient hit head on door. No LOC.

## 2022-04-20 NOTE — ED PROVIDER NOTE - DIAGNOSIS COUNSELING, MDM
conducted a detailed discussion... I had a detailed discussion with the patient and/or guardian regarding the historical points, exam findings, and any diagnostic results supporting the discharge/admit diagnosis. Winlevi Pregnancy And Lactation Text: This medication is considered safe during pregnancy and breastfeeding.

## 2022-11-23 NOTE — ED PROVIDER NOTE - CROS ED ROS STATEMENT
History reviewed. No pertinent past medical history.    Past Surgical History:   Procedure Laterality Date    COLONOSCOPY N/A 11/1/2022    Procedure: COLONOSCOPY;  Surgeon: Michael Jarrett MD;  Location: [facility];  [id number]-[id number]-[id number]-st (GLUCOSAMINE CHONDROITIN PLUS) [phone] and Neer test bilaterally. - [place]'Epic messaging service.             all other ROS negative except as per HPI

## 2023-01-06 NOTE — H&P PST ADULT - ATTENDING PHYSICIAN: I HAVE REVIEWED THE CLINICAL DOCUMENTATION AND AGREE WITH THE ABOVE NOTE
The patient was prepped and draped in supine position on the procedure table. We began by infiltrating the midline scrotal raphe with 2% lidocaine and opened the midline scrotal raphae with a sharp hemostat. The right vas was grasped between thumb and forefinger and brought out to the opening, was then clamped with a vas clamp. The vas sheath was taken down with Bovie cautery and blunt dissection. The midline vas was clipped proximally and distally, a 1 cm section excised. The ends were then cauterized. Hemostasis was excellent.    We then performed a similar procedure on the left side and again hemostasis was excellent. Once finished I placed a sterile dressing. He was given postoperative instructions and an ice pack. He'll return in 8-10 weeks for post procedure semen analysis.   Statement Selected

## 2023-02-08 ENCOUNTER — APPOINTMENT (OUTPATIENT)
Dept: ORTHOPEDIC SURGERY | Facility: CLINIC | Age: 68
End: 2023-02-08
Payer: MEDICARE

## 2023-02-08 VITALS — HEIGHT: 66 IN | BODY MASS INDEX: 21.86 KG/M2 | WEIGHT: 136 LBS

## 2023-02-08 PROCEDURE — 99214 OFFICE O/P EST MOD 30 MIN: CPT | Mod: 25

## 2023-02-08 PROCEDURE — 29280 STRAPPING OF HAND OR FINGER: CPT

## 2023-02-08 RX ORDER — DICLOFENAC SODIUM 1 %
1 KIT TOPICAL DAILY
Qty: 1 | Refills: 2 | Status: ACTIVE | COMMUNITY
Start: 2023-02-08 | End: 1900-01-01

## 2023-02-08 NOTE — IMAGING
[de-identified] : Left index finger MCP joint swelling with ttp of rcl > lcl.\par There is no ligamentous laxity noted.\par  and intrinsic strength is 5/5 and all digit are nvi with FAROM.\par Left wrist with full and pain free ROM.

## 2023-02-08 NOTE — DATA REVIEWED
[Outside X-rays] : outside x-rays [Left] : left [Hand] : hand [I independently reviewed and interpreted images and report] : I independently reviewed and interpreted images and report [FreeTextEntry1] : Left 1st cmc joint OA with no acute bony pathology

## 2023-02-08 NOTE — ASSESSMENT
[FreeTextEntry1] : Pt provided keshawn loop x 6 weeks.\par Pt will rto in 6 weeks for f/u care.\par PRN otc nsaid for discomfort.

## 2023-02-08 NOTE — HISTORY OF PRESENT ILLNESS
[Dull/Aching] : dull/aching [Sharp] : sharp [Occasional] : occasional [de-identified] : 2/8/2023: rhd 68 YO female here with complaint of left index finger MCP pain/swelling x 2 weeks s/p lifting a plate of chicken.\par Pt was seen by her PCP and referred for xray at [facility] (which shows 1st cmc joint OA with no acute pathology).\par \par PMH: Mitral valve prolapse, ibs, htn, \par Allergies: Tetracycline, keflex and bactrim. \par  [] : no [FreeTextEntry1] : left hand  [FreeTextEntry3] : 2 weeks ago  [FreeTextEntry5] : patient was carrying a platter of chicken and tried to catch it when it almost fell. She has been feeling pain since.  [de-identified] : xrays

## 2023-03-22 ENCOUNTER — APPOINTMENT (OUTPATIENT)
Dept: ORTHOPEDIC SURGERY | Facility: CLINIC | Age: 68
End: 2023-03-22
Payer: MEDICARE

## 2023-03-22 VITALS — BODY MASS INDEX: 21.86 KG/M2 | WEIGHT: 136 LBS | HEIGHT: 66 IN

## 2023-03-22 PROCEDURE — 99214 OFFICE O/P EST MOD 30 MIN: CPT | Mod: 25

## 2023-03-22 PROCEDURE — 20600 DRAIN/INJ JOINT/BURSA W/O US: CPT | Mod: LT

## 2023-03-22 NOTE — ASSESSMENT
[FreeTextEntry1] : The patient was advised of the diagnosis. The natural history of the pathology was explained in full to the patient in layman's terms. All questions were answered. The risks and benefits of surgical and non-surgical treatment alternatives were explained in full to the patient.\par \par Small joint injection was performed of the right index finger MP. The indication for this procedure was pain and inflammation. The site was prepped with alcohol and ethyl chloride sprayed topically. An injection of Lidocaine 1cc of 1%  was used Betamethasone (Celestone) 1cc of 6mg.  Patient was advised to call if redness, pain or fever occur and apply ice for 15 minutes out of every hour for the next 12-24 hours as tolerated. The risks benefits, and alternatives have been discussed, and verbal consent was obtained\par \par pt was given CSI in right middle finger MP\par C/w keshawn johnson

## 2023-03-22 NOTE — HISTORY OF PRESENT ILLNESS
[de-identified] : 3/22/23:  Pt has been wearing keshawn loops and has minimal improvement.\par \par 2/8/2023: rhd 66 YO female here with complaint of left index finger MCP pain/swelling x 2 weeks s/p lifting a plate of chicken.\par Pt was seen by her PCP and referred for xray at [facility] (which shows 1st cmc joint OA with no acute pathology).\par \par PMH: Mitral valve prolapse, ibs, htn, \par Allergies: Tetracycline, keflex and bactrim. \par  [FreeTextEntry1] : left hand  [FreeTextEntry5] : patient feels that she is still having a lot of pain when holding anything

## 2023-03-22 NOTE — ED ADULT NURSE NOTE - NURSING NEURO ORIENTATION
TH with 5mm hyperdensity in anterior stella hemorrhage vs cavernona  Rpt CTH [id number] yo female PMHx of vertigo, parkinson's presents to ED bibems  from home s/p mechanical trip and fall from standing height onto face on concrete at 3:30pm today c/o face pain, L knee pain, b/l chest wall pain. She denies LOC.    Plan:  malignant W/O  MR Brain and A/P  Po Diet  LINA
per consult note A/P
oriented to person, place and time

## 2023-03-22 NOTE — IMAGING
[de-identified] : Left index finger MCP joint swelling with ttp of rcl > lcl.\par There is no ligamentous laxity noted.\par  and intrinsic strength is 5/5 and all digit are nvi with FAROM.\par Left wrist with full and pain free ROM.

## 2023-04-19 ENCOUNTER — APPOINTMENT (OUTPATIENT)
Dept: ORTHOPEDIC SURGERY | Facility: CLINIC | Age: 68
End: 2023-04-19

## 2023-04-21 ENCOUNTER — APPOINTMENT (OUTPATIENT)
Dept: ORTHOPEDIC SURGERY | Facility: CLINIC | Age: 68
End: 2023-04-21
Payer: MEDICARE

## 2023-04-21 VITALS — WEIGHT: 136 LBS | BODY MASS INDEX: 21.86 KG/M2 | HEIGHT: 66 IN

## 2023-04-21 PROCEDURE — 99213 OFFICE O/P EST LOW 20 MIN: CPT

## 2023-04-21 NOTE — PHYSICAL EXAM
[de-identified] : L hand \par 2nd MCP swelling\par Tender\par Stiffness\par +instability\par \par Xrays neg

## 2023-04-21 NOTE — HISTORY OF PRESENT ILLNESS
[Sudden] : sudden [8] : 8 [6] : 6 [Dull/Aching] : dull/aching [Radiating] : radiating [Shooting] : shooting [Throbbing] : throbbing [Nothing helps with pain getting better] : Nothing helps with pain getting better [de-identified] : L hand pain since Feb holding a tray of food\par \par IF MCP painand swelling \par \par Had injection by Marcus\par No relief  [] : no [FreeTextEntry1] : L hand  [FreeTextEntry3] : 02/2023 [FreeTextEntry5] : patient states she was holding a platter of food fell off her hand, since she is having pain and swelling. she is today for a second opinion  [FreeTextEntry7] : fingers and wrist  [de-identified] : activity  [de-identified] : 03/22/2023 [de-identified] : XR [de-identified] : Dr Velazquez

## 2023-04-23 ENCOUNTER — FORM ENCOUNTER (OUTPATIENT)
Age: 68
End: 2023-04-23

## 2023-04-24 ENCOUNTER — APPOINTMENT (OUTPATIENT)
Dept: MRI IMAGING | Facility: CLINIC | Age: 68
End: 2023-04-24
Payer: MEDICARE

## 2023-04-24 PROCEDURE — 73218 MRI UPPER EXTREMITY W/O DYE: CPT | Mod: LT,MH

## 2023-05-05 ENCOUNTER — APPOINTMENT (OUTPATIENT)
Dept: ORTHOPEDIC SURGERY | Facility: CLINIC | Age: 68
End: 2023-05-05
Payer: MEDICARE

## 2023-05-05 VITALS — WEIGHT: 136 LBS | HEIGHT: 66 IN | BODY MASS INDEX: 21.86 KG/M2

## 2023-05-05 DIAGNOSIS — S63.650A SPRAIN OF METACARPOPHALANGEAL JOINT OF RIGHT INDEX FINGER, INITIAL ENCOUNTER: ICD-10-CM

## 2023-05-05 PROCEDURE — J3490M: CUSTOM | Mod: NC

## 2023-05-05 PROCEDURE — 20600 DRAIN/INJ JOINT/BURSA W/O US: CPT | Mod: LT

## 2023-05-05 PROCEDURE — 99213 OFFICE O/P EST LOW 20 MIN: CPT | Mod: 25

## 2023-05-05 NOTE — HISTORY OF PRESENT ILLNESS
[8] : 8 [6] : 6 [Dull/Aching] : dull/aching [Radiating] : radiating [Shooting] : shooting [Throbbing] : throbbing [] : yes [de-identified] : L hand MRI shows MCP RCL partial tear, MCP OA\par \par She still has pain  [FreeTextEntry1] : L hand  [FreeTextEntry5] : pain is the same\par  [FreeTextEntry7] : fingers and wrist  [de-identified] : no

## 2023-05-05 NOTE — PHYSICAL EXAM
[de-identified] : L hand \par 2nd MCP swelling\par Tender\par Stiffness\par +instability\par \par

## 2023-05-05 NOTE — ASSESSMENT
[FreeTextEntry1] : L IF MCP Small joint injection was performed because of pain inflammation and stiffness\par Anesthesia: ethyl chloride sprayed topically\par Celestone: An injection of Celestone 1cc\par Marcaine: An injection of Marcaine 0.5% 1cc\par \par Patient has tried OTC's including aspirin, Ibuprofen, Aleve etc or prescription NSAIDS, and/or exercises at home and/ or\par physical therapy without satisfactory response.\par After verbal consent using sterile preparation and technique. The risks, benefits, and alternatives to cortisone injection\par were explained in full to the patient. Risks outlined include but are not limited to infection, sepsis, bleeding, scarring, skin\par discoloration, temporary increase in pain, syncopal episode, failure to resolve symptoms, allergic reaction, symptom\par recurrence, and elevation of blood sugar in diabetics. Patient understood the risks. All questions were answered. After\par discussion of options, patient requested an injection. Oral informed consent was obtained and sterile prep was done of the\par injection site. Sterile technique was utilized for the procedure including the preparation of the solutions used for the\par injection. Patient tolerated the procedure well. Advised to ice the injection site this evening.\par Prep with betadine locally to site. Sterile technique used

## 2023-05-18 ENCOUNTER — APPOINTMENT (OUTPATIENT)
Dept: SURGERY | Facility: CLINIC | Age: 68
End: 2023-05-18
Payer: MEDICARE

## 2023-05-18 VITALS
OXYGEN SATURATION: 99 % | HEIGHT: 66 IN | DIASTOLIC BLOOD PRESSURE: 81 MMHG | BODY MASS INDEX: 21.69 KG/M2 | HEART RATE: 76 BPM | SYSTOLIC BLOOD PRESSURE: 133 MMHG | TEMPERATURE: 98.9 F | WEIGHT: 135 LBS

## 2023-05-18 DIAGNOSIS — R10.12 LEFT UPPER QUADRANT PAIN: ICD-10-CM

## 2023-05-18 DIAGNOSIS — R10.32 LEFT LOWER QUADRANT PAIN: ICD-10-CM

## 2023-05-18 PROCEDURE — 99203 OFFICE O/P NEW LOW 30 MIN: CPT

## 2023-05-19 NOTE — ED PROVIDER NOTE - NSTOBACCO TYPE_GEN_A_CORE_RD
Called patient and left a message for patient to call back when they can. Reviewed patient chart.  Left contact my contact number [phone]        Time Spent This Encounter Total: 5  min medical record review  Monthly Minute Total including today: 5 minutes Cigarettes

## 2023-05-19 NOTE — CONSULT LETTER
[Dear  ___] : Dear  [unfilled], [Sincerely,] : Sincerely, [FreeTextEntry1] : I saw Natty Gomez in the office today in second surgical opinion.  She is a 67-year-old white female who been complaining of intermittent left upper quadrant left lower quadrant abdominal pain.  She also complains of abdominal bloating after eating and chronic constipation..  She has been evaluated by Dr. Mitchell from gastroenterology.  She had a CT scan of her abdomen pelvis performed on February 7, 2023 which was compared to a study done on December 14, 2022 which showed a stable elongated 2.4 x 0.6 x 1.0 cm hypodense structure along the course of the umbilicus.  No associated inflammatory changes or adjacent fat.\par \par She denies any bloody drainage from her umbilicus or any purulent drainage from her umbilicus.  She denies any umbilical discomfort.  Her past medical history is significant for cluster migraines, hypercholesterolemia, hypertension, COPD, GERD, asthma, chronic constipation, and irritable bowel syndrome.  Her past surgical history is significant for tonsillectomy, benign colonoscopy in 2021 as well as a benign upper endoscopy, shoulder surgery, repair of a fractured collarbone, and 3 C-sections.  She smoked 3 cigarettes/day for period of 25 years and quit smoking 5 years ago she occasionally drinks wine.  Her present medications include propranolol, Lasix, Stiolto Respimat, ferritin, fluticasone, dicyclomine, hyoscyamine, pantoprazole, Linzess, Zofran, and Emgality.  She is allergic to tetracycline, Keflex and Bactrim all 3 of which cause a rash.  Her father succumbed to heart attack at age 62 her mother succumbed to colon cancer at age 66.  A review of systems was performed and documented.\par \par On Physical Exam:  General: No acute distress, conversant, well-nourished.  Eyes: PERRLA, EOMI, anicteric.  Conjunctiva are noninjected and moist.  Vision is grossly intact.  ENT: Hearing is grossly intact.  There is no nasal discharge.  Ears and nose are symmetrical and atraumatic.  Nasal, oral, and oral pharyngeal mucosa are pink and moist with no evidence of ulceration.  Head/neck: Head is normocephalic.  Neck is supple.  Carotids have good upstroke.  Trachea is in the midline.  No thyromegaly.  Respiratory: Lungs are clear to auscultation with good inspiratory effort.  No rales, rhonchi or wheezing.  Cardiovascular: Heart is regular in rate and rhythm with no murmurs.  Abdomen: Soft and nontender.  Good bowel sounds present in all 4 quadrants.  No guarding, no rebound, and no peritoneal signs.  No evidence of hepatosplenomegaly.  No evidence of abdominal wall hernias.  Inguinal regions are unremarkable with no evidence of hernias.  Lymphatics: There is no evidence of masses, or lymphadenopathy in the head, neck, trunk, axillary, supraclavicular, or inguinal regions.  Extremities: Extremities reveal no gross deformities, good range of motion, no evidence of edema, no varicosities, no lymphadenopathy and peripheral pulses are intact.  Skin: Good color, turgor, texture with no gross lesions, no eruptions or rashes, no subcutaneous nodules and normal temperature.  Psychiatric: Awake, alert, and oriented x3 with an appropriate affect.\par \par \par \par \par \par \par \par \par \par \par \par \par \par \par Impression: Left upper quadrant and left lower quadrant abdominal pain most likely secondary to chronic constipation.  Hypodense structure along the course of the umbilicus elicited on CT scan.  No signs of urachal cyst or mass.\par \par Plan: No surgical intervention needed at this time. [FreeTextEntry3] : Hawk Talbot D.O., ANKUR, FACS\par , Surgery [facility]\par  Surgery [facility]

## 2023-06-02 ENCOUNTER — APPOINTMENT (OUTPATIENT)
Dept: ORTHOPEDIC SURGERY | Facility: CLINIC | Age: 68
End: 2023-06-02
Payer: MEDICARE

## 2023-06-02 VITALS — BODY MASS INDEX: 21.69 KG/M2 | HEIGHT: 66 IN | WEIGHT: 135 LBS

## 2023-06-02 DIAGNOSIS — S63.651A SPRAIN OF METACARPOPHALANGEAL JOINT OF LEFT INDEX FINGER, INITIAL ENCOUNTER: ICD-10-CM

## 2023-06-02 PROCEDURE — 99213 OFFICE O/P EST LOW 20 MIN: CPT

## 2023-06-02 NOTE — ASSESSMENT
[FreeTextEntry1] : I discussed RCL repair at the 2nd MCP\par She will think about it\par Consider another injection\par Return in 4 weeks

## 2023-06-02 NOTE — PHYSICAL EXAM
[de-identified] : L hand \par 2nd MCP swelling\par Tender\par Stiffness\par +instability\par \par

## 2023-06-02 NOTE — HISTORY OF PRESENT ILLNESS
[4] : 4 [Dull/Aching] : dull/aching [8] : 8 [de-identified] : L 2nd MCP injection last visit helped for a few days\par Has pain  [FreeTextEntry1] : L hand [de-identified] : inj,glove

## 2023-06-23 ENCOUNTER — APPOINTMENT (OUTPATIENT)
Dept: ORTHOPEDIC SURGERY | Facility: CLINIC | Age: 68
End: 2023-06-23

## 2023-07-10 ENCOUNTER — OUTPATIENT (OUTPATIENT)
Dept: OUTPATIENT SERVICES | Facility: HOSPITAL | Age: 68
LOS: 1 days | End: 2023-07-10
Payer: MEDICARE

## 2023-07-10 VITALS
OXYGEN SATURATION: 100 % | TEMPERATURE: 98 F | HEART RATE: 63 BPM | WEIGHT: 139.99 LBS | SYSTOLIC BLOOD PRESSURE: 137 MMHG | RESPIRATION RATE: 14 BRPM | DIASTOLIC BLOOD PRESSURE: 72 MMHG | HEIGHT: 66 IN

## 2023-07-10 DIAGNOSIS — Z01.818 ENCOUNTER FOR OTHER PREPROCEDURAL EXAMINATION: ICD-10-CM

## 2023-07-10 DIAGNOSIS — Z98.890 OTHER SPECIFIED POSTPROCEDURAL STATES: Chronic | ICD-10-CM

## 2023-07-10 DIAGNOSIS — Z98.49 CATARACT EXTRACTION STATUS, UNSPECIFIED EYE: Chronic | ICD-10-CM

## 2023-07-10 DIAGNOSIS — S53.32XA TRAUMATIC RUPTURE OF LEFT ULNAR COLLATERAL LIGAMENT, INITIAL ENCOUNTER: ICD-10-CM

## 2023-07-10 DIAGNOSIS — Z90.89 ACQUIRED ABSENCE OF OTHER ORGANS: Chronic | ICD-10-CM

## 2023-07-10 DIAGNOSIS — Z98.891 HISTORY OF UTERINE SCAR FROM PREVIOUS SURGERY: Chronic | ICD-10-CM

## 2023-07-10 DIAGNOSIS — N90.89 OTHER SPECIFIED NONINFLAMMATORY DISORDERS OF VULVA AND PERINEUM: Chronic | ICD-10-CM

## 2023-07-10 LAB
ALBUMIN SERPL ELPH-MCNC: 3.7 G/DL — SIGNIFICANT CHANGE UP (ref 3.3–5)
ALP SERPL-CCNC: 62 U/L — SIGNIFICANT CHANGE UP (ref 40–120)
ALT FLD-CCNC: 23 U/L — SIGNIFICANT CHANGE UP (ref 12–78)
ANION GAP SERPL CALC-SCNC: 5 MMOL/L — SIGNIFICANT CHANGE UP (ref 5–17)
AST SERPL-CCNC: 13 U/L — LOW (ref 15–37)
BILIRUB SERPL-MCNC: 0.5 MG/DL — SIGNIFICANT CHANGE UP (ref 0.2–1.2)
BUN SERPL-MCNC: 9 MG/DL — SIGNIFICANT CHANGE UP (ref 7–23)
CALCIUM SERPL-MCNC: 9.2 MG/DL — SIGNIFICANT CHANGE UP (ref 8.5–10.1)
CHLORIDE SERPL-SCNC: 96 MMOL/L — SIGNIFICANT CHANGE UP (ref 96–108)
CO2 SERPL-SCNC: 31 MMOL/L — SIGNIFICANT CHANGE UP (ref 22–31)
CREAT SERPL-MCNC: 0.58 MG/DL — SIGNIFICANT CHANGE UP (ref 0.5–1.3)
EGFR: 100 ML/MIN/1.73M2 — SIGNIFICANT CHANGE UP
GLUCOSE SERPL-MCNC: 104 MG/DL — HIGH (ref 70–99)
HCT VFR BLD CALC: 35.4 % — SIGNIFICANT CHANGE UP (ref 34.5–45)
HGB BLD-MCNC: 12 G/DL — SIGNIFICANT CHANGE UP (ref 11.5–15.5)
MCHC RBC-ENTMCNC: 33.9 GM/DL — SIGNIFICANT CHANGE UP (ref 32–36)
MCHC RBC-ENTMCNC: 34.7 PG — HIGH (ref 27–34)
MCV RBC AUTO: 102.3 FL — HIGH (ref 80–100)
NRBC # BLD: 0 /100 WBCS — SIGNIFICANT CHANGE UP (ref 0–0)
PLATELET # BLD AUTO: 246 K/UL — SIGNIFICANT CHANGE UP (ref 150–400)
POTASSIUM SERPL-MCNC: 4 MMOL/L — SIGNIFICANT CHANGE UP (ref 3.5–5.3)
POTASSIUM SERPL-SCNC: 4 MMOL/L — SIGNIFICANT CHANGE UP (ref 3.5–5.3)
PROT SERPL-MCNC: 6.6 G/DL — SIGNIFICANT CHANGE UP (ref 6–8.3)
RBC # BLD: 3.46 M/UL — LOW (ref 3.8–5.2)
RBC # FLD: 13.8 % — SIGNIFICANT CHANGE UP (ref 10.3–14.5)
SODIUM SERPL-SCNC: 132 MMOL/L — LOW (ref 135–145)
WBC # BLD: 5.81 K/UL — SIGNIFICANT CHANGE UP (ref 3.8–10.5)
WBC # FLD AUTO: 5.81 K/UL — SIGNIFICANT CHANGE UP (ref 3.8–10.5)

## 2023-07-10 PROCEDURE — 80053 COMPREHEN METABOLIC PANEL: CPT

## 2023-07-10 PROCEDURE — 83036 HEMOGLOBIN GLYCOSYLATED A1C: CPT

## 2023-07-10 PROCEDURE — 85027 COMPLETE CBC AUTOMATED: CPT

## 2023-07-10 PROCEDURE — 93010 ELECTROCARDIOGRAM REPORT: CPT

## 2023-07-10 PROCEDURE — G0463: CPT

## 2023-07-10 PROCEDURE — 93005 ELECTROCARDIOGRAM TRACING: CPT

## 2023-07-10 PROCEDURE — 36415 COLL VENOUS BLD VENIPUNCTURE: CPT

## 2023-07-10 RX ORDER — ONDANSETRON 8 MG/1
0 TABLET, FILM COATED ORAL
Qty: 0 | Refills: 0 | DISCHARGE

## 2023-07-10 RX ORDER — CHOLECALCIFEROL (VITAMIN D3) 125 MCG
0 CAPSULE ORAL
Qty: 0 | Refills: 0 | DISCHARGE

## 2023-07-10 RX ORDER — AMLODIPINE BESYLATE 2.5 MG/1
1 TABLET ORAL
Qty: 0 | Refills: 0 | DISCHARGE

## 2023-07-10 RX ORDER — GALCANEZUMAB 100 MG/ML
0 INJECTION, SOLUTION SUBCUTANEOUS
Qty: 0 | Refills: 0 | DISCHARGE

## 2023-07-10 RX ORDER — TIOTROPIUM BROMIDE AND OLODATEROL 3.124; 2.736 UG/1; UG/1
2 SPRAY, METERED RESPIRATORY (INHALATION)
Qty: 0 | Refills: 0 | DISCHARGE

## 2023-07-10 RX ORDER — PANTOPRAZOLE SODIUM 20 MG/1
1 TABLET, DELAYED RELEASE ORAL
Qty: 0 | Refills: 0 | DISCHARGE

## 2023-07-10 RX ORDER — PREGABALIN 225 MG/1
0 CAPSULE ORAL
Qty: 0 | Refills: 0 | DISCHARGE

## 2023-07-10 RX ORDER — PROPRANOLOL HCL 160 MG
0 CAPSULE, EXTENDED RELEASE 24HR ORAL
Qty: 0 | Refills: 0 | DISCHARGE

## 2023-07-10 RX ORDER — FLUTICASONE PROPIONATE 50 MCG
1 SPRAY, SUSPENSION NASAL
Qty: 0 | Refills: 0 | DISCHARGE

## 2023-07-10 RX ORDER — ONABOTULINUMTOXINA 100 UNIT
0 VIAL (EA) INJECTION
Qty: 0 | Refills: 0 | DISCHARGE

## 2023-07-10 RX ORDER — ALPRAZOLAM 0.25 MG
0 TABLET ORAL
Qty: 0 | Refills: 0 | DISCHARGE

## 2023-07-10 RX ORDER — FUROSEMIDE 40 MG
0 TABLET ORAL
Qty: 0 | Refills: 0 | DISCHARGE

## 2023-07-10 NOTE — H&P PST ADULT - LAST ECHOCARDIOGRAM
Notes prior ECHO within the last few years [facility] - Dr Chowdhury Notes prior ECHO within the last few years [facility] - Dr Chowdhury- Called office to fax over results  S/W Josephine

## 2023-07-10 NOTE — H&P PST ADULT - PROBLEM SELECTOR PLAN 1
PST labs; CBC, CMP, EKG. Pt to make appointment for medical clearance as soon as possible.  Pt instructed to stop any NSAIDS/Herbal Supplements between now and procedure. May take Tylenol if needed for any pain between now and procedure. Morning of procedure she may take her Pantoprazole, Claritin, Propranolol, with small sips of water. She may also take her Fluticasone Inhaler as well as her Xiidra Eye drops. Pre-op instructions as well as pre-op wash instructions given to pt with understanding verbalized. All questions addressed with pt prior to her leaving the PST department today.

## 2023-07-10 NOTE — H&P PST ADULT - LAST STRESS TEST
Notes prior H/O Stress Test within the last few years - Done at [facility] Dr Chowdhury Notes prior H/O  PET Stress Test within the last few years - Done at [facility] Dr Chowdhury

## 2023-07-10 NOTE — H&P PST ADULT - NSICDXPASTSURGICALHX_GEN_ALL_CORE_FT
PAST SURGICAL HISTORY:  H/O  section x3    H/O excision of mass left shoulder    S/P cataract surgery 5 years ago    S/P rotator cuff repair left 2019    S/P tonsillectomy age 3    Vulvar lesion

## 2023-07-10 NOTE — H&P PST ADULT - NSANTHSNORERD_ENT_A_CORE
Yoli NAYLOR Imani has an upcoming lab appointment:    Future Appointments   Date Time Provider Department Center   8/1/2022  3:30 PM [facility]   8/4/2022 10:30 AM López Valdez PA-C [facility]     Patient is scheduled for the following lab(s):  Lab orders per Courtney    There is no order available. Please review and place either future orders or HMPO (Review of Health Maintenance Protocol Orders), as appropriate.    There are no preventive care reminders to display for this patient.  Nathan Tuttle    
No

## 2023-07-10 NOTE — H&P PST ADULT - ASSESSMENT
67 year old female PMH HTN, Mitral Valve Prolapse, IBS, Prediabetes, Anxiety, Emphysema, COPD, Neck Pain, Migraines, Acid Reflux, Hiatal Hernia, Arnie's Esophagus, Other Specified Conditions Associated With Female Genital organs & Menstrual Cycle; now with Traumatic Rupture LEFT Ulnar Collateral Ligament Initial Encounter; scheduled for Repair LEFT Index Right Radial Collateral Ligament Rupture Utilizing Fluoroscopy with Dr Claudio Rosales on 7/20/23.

## 2023-07-10 NOTE — H&P PST ADULT - NSICDXPASTMEDICALHX_GEN_ALL_CORE_FT
PAST MEDICAL HISTORY:  Acid reflux     Anxiety     Barretts esophagus     Chronic obstructive pulmonary disease (COPD)     H/O emphysema     Hiatal hernia     HTN (hypertension)     Irritable bowel syndrome     Migraines once a week    Mitral valve prolapse     Neck pain treated with monthly trigger point injections    Other specified conditions associated with female genital organs and menstrual cycle     Prediabetes     Traumatic rupture of left ulnar collateral ligament, initial encounter

## 2023-07-10 NOTE — H&P PST ADULT - MUSCULOSKELETAL
Left Index Finger Swelling - tenderness on exam/palpation/decreased ROM/decreased ROM due to pain/normal gait details…

## 2023-07-11 LAB
A1C WITH ESTIMATED AVERAGE GLUCOSE RESULT: 5.8 % — HIGH (ref 4–5.6)
ESTIMATED AVERAGE GLUCOSE: 120 MG/DL — HIGH (ref 68–114)

## 2023-07-19 ENCOUNTER — TRANSCRIPTION ENCOUNTER (OUTPATIENT)
Age: 68
End: 2023-07-19

## 2023-07-19 RX ORDER — SODIUM CHLORIDE 9 MG/ML
1000 INJECTION, SOLUTION INTRAVENOUS
Refills: 0 | Status: DISCONTINUED | OUTPATIENT
Start: 2023-07-20 | End: 2023-07-20

## 2023-07-20 ENCOUNTER — TRANSCRIPTION ENCOUNTER (OUTPATIENT)
Age: 68
End: 2023-07-20

## 2023-07-20 ENCOUNTER — OUTPATIENT (OUTPATIENT)
Dept: OUTPATIENT SERVICES | Facility: HOSPITAL | Age: 68
LOS: 1 days | End: 2023-07-20
Payer: MEDICARE

## 2023-07-20 VITALS
TEMPERATURE: 98 F | HEART RATE: 68 BPM | DIASTOLIC BLOOD PRESSURE: 67 MMHG | SYSTOLIC BLOOD PRESSURE: 144 MMHG | RESPIRATION RATE: 14 BRPM | OXYGEN SATURATION: 96 %

## 2023-07-20 VITALS
OXYGEN SATURATION: 96 % | HEART RATE: 74 BPM | WEIGHT: 139.99 LBS | HEIGHT: 66 IN | DIASTOLIC BLOOD PRESSURE: 52 MMHG | TEMPERATURE: 98 F | RESPIRATION RATE: 13 BRPM | SYSTOLIC BLOOD PRESSURE: 129 MMHG

## 2023-07-20 DIAGNOSIS — Z98.890 OTHER SPECIFIED POSTPROCEDURAL STATES: Chronic | ICD-10-CM

## 2023-07-20 DIAGNOSIS — N90.89 OTHER SPECIFIED NONINFLAMMATORY DISORDERS OF VULVA AND PERINEUM: Chronic | ICD-10-CM

## 2023-07-20 DIAGNOSIS — Z98.891 HISTORY OF UTERINE SCAR FROM PREVIOUS SURGERY: Chronic | ICD-10-CM

## 2023-07-20 DIAGNOSIS — S53.32XA TRAUMATIC RUPTURE OF LEFT ULNAR COLLATERAL LIGAMENT, INITIAL ENCOUNTER: ICD-10-CM

## 2023-07-20 DIAGNOSIS — Z98.49 CATARACT EXTRACTION STATUS, UNSPECIFIED EYE: Chronic | ICD-10-CM

## 2023-07-20 DIAGNOSIS — Z90.89 ACQUIRED ABSENCE OF OTHER ORGANS: Chronic | ICD-10-CM

## 2023-07-20 PROCEDURE — 36415 COLL VENOUS BLD VENIPUNCTURE: CPT

## 2023-07-20 PROCEDURE — 26540 REPAIR HAND JOINT: CPT | Mod: F1

## 2023-07-20 PROCEDURE — C1713: CPT

## 2023-07-20 DEVICE — IMPLANTABLE DEVICE: Type: IMPLANTABLE DEVICE | Site: LEFT | Status: FUNCTIONAL

## 2023-07-20 RX ORDER — ALPRAZOLAM 0.25 MG
1 TABLET ORAL
Refills: 0 | DISCHARGE

## 2023-07-20 RX ORDER — FUROSEMIDE 40 MG
1 TABLET ORAL
Refills: 0 | DISCHARGE

## 2023-07-20 RX ORDER — ONDANSETRON 8 MG/1
4 TABLET, FILM COATED ORAL ONCE
Refills: 0 | Status: DISCONTINUED | OUTPATIENT
Start: 2023-07-20 | End: 2023-07-20

## 2023-07-20 RX ORDER — FLUTICASONE PROPIONATE 220 MCG
2 AEROSOL WITH ADAPTER (GRAM) INHALATION
Refills: 0 | DISCHARGE

## 2023-07-20 RX ORDER — OXYCODONE HYDROCHLORIDE 5 MG/1
1 TABLET ORAL
Qty: 10 | Refills: 0
Start: 2023-07-20

## 2023-07-20 RX ORDER — ZINC ACETATE DIHYDRATE 100 %
1 CRYSTALS MISCELLANEOUS
Refills: 0 | DISCHARGE

## 2023-07-20 RX ORDER — LINACLOTIDE 145 UG/1
1 CAPSULE, GELATIN COATED ORAL
Refills: 0 | DISCHARGE

## 2023-07-20 RX ORDER — RIZATRIPTAN BENZOATE 5 MG/1
1 TABLET ORAL
Refills: 0 | DISCHARGE

## 2023-07-20 RX ORDER — HYOSCYAMINE SULFATE 0.13 MG
1 TABLET ORAL
Refills: 0 | DISCHARGE

## 2023-07-20 RX ORDER — SODIUM CHLORIDE 9 MG/ML
1000 INJECTION, SOLUTION INTRAVENOUS
Refills: 0 | Status: DISCONTINUED | OUTPATIENT
Start: 2023-07-20 | End: 2023-07-20

## 2023-07-20 RX ORDER — OXYCODONE HYDROCHLORIDE 5 MG/1
5 TABLET ORAL ONCE
Refills: 0 | Status: DISCONTINUED | OUTPATIENT
Start: 2023-07-20 | End: 2023-07-20

## 2023-07-20 RX ORDER — HYDROMORPHONE HYDROCHLORIDE 2 MG/ML
0.5 INJECTION INTRAMUSCULAR; INTRAVENOUS; SUBCUTANEOUS
Refills: 0 | Status: DISCONTINUED | OUTPATIENT
Start: 2023-07-20 | End: 2023-07-20

## 2023-07-20 RX ORDER — LIFITEGRAST 50 MG/ML
1 SOLUTION/ DROPS OPHTHALMIC
Qty: 0 | Refills: 0 | DISCHARGE

## 2023-07-20 RX ORDER — ONDANSETRON 8 MG/1
1 TABLET, FILM COATED ORAL
Refills: 0 | DISCHARGE

## 2023-07-20 RX ORDER — PANTOPRAZOLE SODIUM 20 MG/1
1 TABLET, DELAYED RELEASE ORAL
Refills: 0 | DISCHARGE

## 2023-07-20 RX ORDER — PROPRANOLOL HCL 160 MG
1 CAPSULE, EXTENDED RELEASE 24HR ORAL
Refills: 0 | DISCHARGE

## 2023-07-20 RX ORDER — LORATADINE 10 MG/1
1 TABLET ORAL
Refills: 0 | DISCHARGE

## 2023-07-20 RX ADMIN — HYDROMORPHONE HYDROCHLORIDE 0.5 MILLIGRAM(S): 2 INJECTION INTRAMUSCULAR; INTRAVENOUS; SUBCUTANEOUS at 17:47

## 2023-07-20 RX ADMIN — HYDROMORPHONE HYDROCHLORIDE 0.5 MILLIGRAM(S): 2 INJECTION INTRAMUSCULAR; INTRAVENOUS; SUBCUTANEOUS at 17:32

## 2023-07-20 RX ADMIN — SODIUM CHLORIDE 75 MILLILITER(S): 9 INJECTION, SOLUTION INTRAVENOUS at 17:24

## 2023-07-20 RX ADMIN — SODIUM CHLORIDE 75 MILLILITER(S): 9 INJECTION, SOLUTION INTRAVENOUS at 13:21

## 2023-07-20 NOTE — BRIEF OPERATIVE NOTE - NSICDXBRIEFPOSTOP_GEN_ALL_CORE_FT
POST-OP DIAGNOSIS:  Complete tear of radial collateral ligament of metacarpophalangeal (MCP) joint of finger 20-Jul-2023 17:14:11  Roshan Rod

## 2023-07-20 NOTE — ASU DISCHARGE PLAN (ADULT/PEDIATRIC) - CARE PROVIDER_API CALL
Claudio Rosales.  Orthopaedic Surgery  [address] [place] [address]  Phone: [phone]  Fax: [phone]  Established Patient  Follow Up Time: 2 weeks

## 2023-07-20 NOTE — BRIEF OPERATIVE NOTE - NSICDXBRIEFPREOP_GEN_ALL_CORE_FT
PRE-OP DIAGNOSIS:  Complete tear of radial collateral ligament of metacarpophalangeal (MCP) joint of finger 20-Jul-2023 17:14:02  Roshan Rod

## 2023-07-20 NOTE — ASU DISCHARGE PLAN (ADULT/PEDIATRIC) - NS MD DC FALL RISK RISK
For information on Fall & Injury Prevention, visit: [email or web address] OR  [email or web address] OR  https://www.cdc.gov/steadi/patient.html

## 2023-07-20 NOTE — ASU DISCHARGE PLAN (ADULT/PEDIATRIC) - MEDICATION INSTRUCTIONS
The following prescription have been electronically sent to your pharmacy by Dr. Rosales's office:  Oxycodone 5 mg (take1-to-2 tablets by mouth, every 4-to-6 hours as needed for as needed for pain;  Clindamycin 300 mg (take 1 tablet by mouth twice daily x 3 days)

## 2023-07-20 NOTE — BRIEF OPERATIVE NOTE - NSICDXBRIEFPROCEDURE_GEN_ALL_CORE_FT
PROCEDURES:  Repair of radial collateral ligament of metacarpophalangeal (MCP) joint 20-Jul-2023 17:13:31  Roshan Rod

## 2023-07-20 NOTE — ASU DISCHARGE PLAN (ADULT/PEDIATRIC) - ASU DC SPECIAL INSTRUCTIONSFT
1.  Keep your dressing clean and dry; do NOT remove  2.  Do NOT lift or carry anything with your left hand   3.  Elevate your left hand above chest level as much as possible  4.  Schedule a follow-up appointment to be seen by Dr. Rosales in 13 days (Wed, 8/2)

## 2024-02-13 ENCOUNTER — RX RENEWAL (OUTPATIENT)
Age: 69
End: 2024-02-13

## 2024-02-13 RX ORDER — DICLOFENAC SODIUM 1% 10 MG/G
1 GEL TOPICAL
Qty: 200 | Refills: 2 | Status: ACTIVE | COMMUNITY
Start: 2024-02-13 | End: 1900-01-01

## 2024-12-08 NOTE — ASU PATIENT PROFILE, ADULT - NS PRO PASSIVE SMOKE EXP
Has been drinking over past 20 years  And bourbon and wine yesterday, last drink at midnight.  Presented with tremors, tongue fasciculations, diaphoresis, nausea and vomiting.  Was noted to be tachycardic and hypertensive.  Received diazepam and phenobarbital in the ED.    Plan:  Toxicology consulted patient: Recommend [facility] protocol with use of phenobarbital or diazepam.  Phenobarbital: 130 mg IV for mild to moderate symptoms and 260 mg IV for moderate to severe symptoms.  Max dose of 2 g 24 hours if patient is still exhibiting withdrawal symptoms adjunct ketamine infusion is recommended.  Please see medical toxicology note for further details.  Thiamine and folate IV given continue supplementation p.o. starting tomorrow  IV hydration Isolyte 100 cc/hr   Yes...

## 2024-12-09 NOTE — ED ADULT NURSE NOTE - NS PRO AD BILL OF RIGHTS
"RN spoke with patient after receiving message from Dotty Lundberg RN.     Patient stated she has been experiencing nausea and vomiting. She reported one episode of vomiting and denies taking any Zofran. She states that she does have a  prescription for Zofran at home. RN advised her to take that as needed for nausea and vomiting to see if it helps.     She denies fever or constipation. She did say she was up all night itching and her neighbors were loud and kept her from sleeping. She states the itching is a new symptom. She also reports she feels weak and dizzy and reported a blood pressure of \"170 over 70 something.\" She states any time she gets up, she feels like she needs to hold on to something from keeping from falling. She reports she is drinking plenty of fluids, including water and juice.     RN informed her that RN will discuss with MD on call, Dr. Zazueta, and get her recommendations and then will call her back.     RN spoke with Dr. Zazueta who recommended the patient come in for an acute visit tomorrow 12/9. RN relayed this to patient who was agreeable. RN transferred call to  so patient could get scheduled.     " Yes

## 2025-03-21 NOTE — ED ADULT NURSE NOTE - CAS ELECT INFOMATION PROVIDED
Diabetic Eye Exam Form    Date Requested: 25  Patient: Jay Stout  Patient : 1940   Referring Provider: Adrienne Richard MD      DIABETIC Eye Exam Date _______________________________      Type of Exam MUST be documented for Diabetic Eye Exams. Please CHECK ONE.     Retinal Exam       Dilated Retinal Exam       OCT       Optomap-Iris Exam      Fundus Photography       Left Eye - Please check Retinopathy or No Retinopathy        Exam did show retinopathy    Exam did not show retinopathy       Right Eye - Please check Retinopathy or No Retinopathy       Exam did show retinopathy    Exam did not show retinopathy       Comments ______Most recent  ____________________________________________________    Practice Providing Exam ______________________________________________    Exam Performed By (print name) _______________________________________      Provider Signature ___________________________________________________      These reports are needed for  compliance.    Please fax this completed form and a copy of the Diabetic Eye Exam report to the [facility] Based Department as soon as possible via Fax [phone], attention Haley: Phone [phone]. Our office is located at [address] [place] [address].     We thank you for your assistance in treating our mutual patient.      
DC instructions

## 2025-05-19 NOTE — ED ADULT NURSE NOTE - MODE OF DISCHARGE
NOTIFICATION OF ADMISSION DISCHARGE   This is a Notification of Discharge from [facility]. Please be advised that this patient has been discharge from our facility. Below you will find the admission and discharge date and time including the patient’s disposition.   UTILIZATION REVIEW CONTACT:  Utilization Review Assistants  Network Utilization Review Department  Phone: [phone] x carefully listen to the prompts. All voicemails are confidential.  Email: [email or web address]     ADMISSION INFORMATION  PRESENTATION DATE: 5/15/2025  7:48 AM  OBERVATION ADMISSION DATE: N/A  INPATIENT ADMISSION DATE: 5/15/25  7:48 AM   DISCHARGE DATE: 5/17/2025  1:50 PM   DISPOSITION:Home/Self Care    Network Utilization Review Department  ATTENTION: Please call with any questions or concerns to [phone] and carefully listen to the prompts so that you are directed to the right person. All voicemails are confidential.   For Discharge needs, contact Care Management DC Support Team at [phone] opt. [phone]  Send all requests for admission clinical reviews, approved or denied determinations and any other requests to dedicated fax number below belonging to the campus where the patient is receiving treatment. List of dedicated fax numbers for the Facilities:  FACILITY NAME UR FAX NUMBER   ADMISSION DENIALS (Administrative/Medical Necessity) [phone]   DISCHARGE SUPPORT TEAM ([facility]) [phone]   PARENT CHILD HEALTH (Maternity/NICU/Pediatrics) [phone]   [facility] [phone]   [facility] [phone]   [facility] [phone]   [facility] [phone]   [facility] [phone]   [facility] [phone]   [facility] [phone]   [facility] [phone]   [facility]  [facility] [phone]   [facility] [phone]   [facility] [phone]   [facility] [phone]           Ambulatory

## (undated) DEVICE — SUT MONOSOF 5-0 18" P-13

## (undated) DEVICE — SOL IRR POUR NS 0.9% 1500ML

## (undated) DEVICE — VENODYNE/SCD SLEEVE CALF LARGE

## (undated) DEVICE — DRAPE INSTRUMENT POUCH 6.75" X 11"

## (undated) DEVICE — PLV/PSP-TOURNIQUET #7 4020073: Type: DURABLE MEDICAL EQUIPMENT

## (undated) DEVICE — SYR LUER LOK 30CC

## (undated) DEVICE — VENODYNE/SCD SLEEVE CALF MEDIUM

## (undated) DEVICE — GLV 8 PROTEXIS (WHITE)

## (undated) DEVICE — DRSG KLING 3"

## (undated) DEVICE — GOWN LG

## (undated) DEVICE — BUR MICROAIRE CARBIDE OVAL 4MM 8 FLUTE

## (undated) DEVICE — SAW BLADE LINVATEC SAGITTAL MIC 9.5X25.5X0.4MM

## (undated) DEVICE — PLV/PSP-ESU T7E14761DX: Type: DURABLE MEDICAL EQUIPMENT

## (undated) DEVICE — SUT POLYSORB 3-0 30" V-20 UNDYED

## (undated) DEVICE — DRAPE 3/4 SHEET W REINFORCEMENT 56X77"

## (undated) DEVICE — DRAPE C ARM MINI PACK FOR 6800

## (undated) DEVICE — DRAPE LIGHT HANDLE COVER (GREEN)

## (undated) DEVICE — DRAPE U POLY BLUE 60"X60"

## (undated) DEVICE — WARMING BLANKET LOWER ADULT

## (undated) DEVICE — DRSG XEROFORM 1 X 8"

## (undated) DEVICE — PACK HAND

## (undated) DEVICE — DRSG CURITY GAUZE SPONGE 4 X 4" 12-PLY

## (undated) DEVICE — PLV-SCD MACHINE: Type: DURABLE MEDICAL EQUIPMENT

## (undated) DEVICE — SUT POLYSORB 4-0 18" P-12 UNDYED

## (undated) DEVICE — TOURNIQUET CUFF 18" DUAL PORT SINGLE BLADDER LUER LOCK (BLACK)

## (undated) DEVICE — SUT MONOSOF 5-0 18" P-12

## (undated) DEVICE — SUT POLYSORB 4-0 30" V-20 UNDYED

## (undated) DEVICE — ELCTR BIPOLAR CORD J&J 12FT DISP